# Patient Record
Sex: FEMALE | Race: OTHER
[De-identification: names, ages, dates, MRNs, and addresses within clinical notes are randomized per-mention and may not be internally consistent; named-entity substitution may affect disease eponyms.]

---

## 2017-03-22 ENCOUNTER — MESSAGE (OUTPATIENT)
Age: 62
End: 2017-03-22

## 2018-11-02 ENCOUNTER — LABORATORY RESULT (OUTPATIENT)
Age: 63
End: 2018-11-02

## 2018-11-02 ENCOUNTER — APPOINTMENT (OUTPATIENT)
Dept: UROGYNECOLOGY | Facility: CLINIC | Age: 63
End: 2018-11-02
Payer: MEDICARE

## 2018-11-02 VITALS
DIASTOLIC BLOOD PRESSURE: 80 MMHG | HEIGHT: 63 IN | BODY MASS INDEX: 34.55 KG/M2 | SYSTOLIC BLOOD PRESSURE: 130 MMHG | WEIGHT: 195 LBS

## 2018-11-02 DIAGNOSIS — R10.2 PELVIC AND PERINEAL PAIN: ICD-10-CM

## 2018-11-02 DIAGNOSIS — N95.2 POSTMENOPAUSAL ATROPHIC VAGINITIS: ICD-10-CM

## 2018-11-02 DIAGNOSIS — Z87.440 PERSONAL HISTORY OF URINARY (TRACT) INFECTIONS: ICD-10-CM

## 2018-11-02 DIAGNOSIS — R39.198 OTHER DIFFICULTIES WITH MICTURITION: ICD-10-CM

## 2018-11-02 PROCEDURE — 99204 OFFICE O/P NEW MOD 45 MIN: CPT | Mod: 25

## 2018-11-02 PROCEDURE — 51798 US URINE CAPACITY MEASURE: CPT

## 2020-12-16 PROBLEM — Z87.440 HISTORY OF URINARY TRACT INFECTION: Status: RESOLVED | Noted: 2018-11-01 | Resolved: 2020-12-16

## 2023-12-12 ENCOUNTER — EMERGENCY (EMERGENCY)
Facility: HOSPITAL | Age: 68
LOS: 0 days | Discharge: HOME | End: 2023-12-13
Attending: EMERGENCY MEDICINE
Payer: MEDICARE

## 2023-12-12 VITALS
WEIGHT: 179.9 LBS | HEIGHT: 63 IN | RESPIRATION RATE: 18 BRPM | TEMPERATURE: 98 F | OXYGEN SATURATION: 98 % | SYSTOLIC BLOOD PRESSURE: 192 MMHG | HEART RATE: 115 BPM | DIASTOLIC BLOOD PRESSURE: 80 MMHG

## 2023-12-12 DIAGNOSIS — Z91.048 OTHER NONMEDICINAL SUBSTANCE ALLERGY STATUS: ICD-10-CM

## 2023-12-12 DIAGNOSIS — M79.89 OTHER SPECIFIED SOFT TISSUE DISORDERS: ICD-10-CM

## 2023-12-12 DIAGNOSIS — Z87.891 PERSONAL HISTORY OF NICOTINE DEPENDENCE: ICD-10-CM

## 2023-12-12 PROCEDURE — 36415 COLL VENOUS BLD VENIPUNCTURE: CPT

## 2023-12-12 PROCEDURE — 80053 COMPREHEN METABOLIC PANEL: CPT

## 2023-12-12 PROCEDURE — 99285 EMERGENCY DEPT VISIT HI MDM: CPT | Mod: 25

## 2023-12-12 PROCEDURE — 85025 COMPLETE CBC W/AUTO DIFF WBC: CPT

## 2023-12-12 PROCEDURE — 93010 ELECTROCARDIOGRAM REPORT: CPT

## 2023-12-12 PROCEDURE — 93005 ELECTROCARDIOGRAM TRACING: CPT

## 2023-12-12 PROCEDURE — 99285 EMERGENCY DEPT VISIT HI MDM: CPT | Mod: FS

## 2023-12-12 PROCEDURE — 76882 US LMTD JT/FCL EVL NVASC XTR: CPT | Mod: LT

## 2023-12-12 PROCEDURE — 73080 X-RAY EXAM OF ELBOW: CPT | Mod: LT

## 2023-12-12 RX ORDER — SODIUM CHLORIDE 9 MG/ML
1000 INJECTION INTRAMUSCULAR; INTRAVENOUS; SUBCUTANEOUS ONCE
Refills: 0 | Status: COMPLETED | OUTPATIENT
Start: 2023-12-12 | End: 2023-12-12

## 2023-12-12 NOTE — ED ADULT TRIAGE NOTE - CHIEF COMPLAINT QUOTE
PT presents to the Ed c/o of abnormal EKG. Pt sent in from urgent care due to an abnormal EKG. Pt denies Chest pain or SOB. Pt only c/o of L arm pain and swelling which prompted her to go to urgent care

## 2023-12-13 VITALS
RESPIRATION RATE: 18 BRPM | HEART RATE: 83 BPM | DIASTOLIC BLOOD PRESSURE: 64 MMHG | SYSTOLIC BLOOD PRESSURE: 149 MMHG | TEMPERATURE: 98 F | OXYGEN SATURATION: 97 %

## 2023-12-13 LAB
ALBUMIN SERPL ELPH-MCNC: 3.9 G/DL — SIGNIFICANT CHANGE UP (ref 3.5–5.2)
ALBUMIN SERPL ELPH-MCNC: 3.9 G/DL — SIGNIFICANT CHANGE UP (ref 3.5–5.2)
ALP SERPL-CCNC: 114 U/L — SIGNIFICANT CHANGE UP (ref 30–115)
ALP SERPL-CCNC: 114 U/L — SIGNIFICANT CHANGE UP (ref 30–115)
ALT FLD-CCNC: 9 U/L — SIGNIFICANT CHANGE UP (ref 0–41)
ALT FLD-CCNC: 9 U/L — SIGNIFICANT CHANGE UP (ref 0–41)
ANION GAP SERPL CALC-SCNC: 12 MMOL/L — SIGNIFICANT CHANGE UP (ref 7–14)
ANION GAP SERPL CALC-SCNC: 12 MMOL/L — SIGNIFICANT CHANGE UP (ref 7–14)
AST SERPL-CCNC: 16 U/L — SIGNIFICANT CHANGE UP (ref 0–41)
AST SERPL-CCNC: 16 U/L — SIGNIFICANT CHANGE UP (ref 0–41)
BASOPHILS # BLD AUTO: 0.06 K/UL — SIGNIFICANT CHANGE UP (ref 0–0.2)
BASOPHILS # BLD AUTO: 0.06 K/UL — SIGNIFICANT CHANGE UP (ref 0–0.2)
BASOPHILS NFR BLD AUTO: 0.6 % — SIGNIFICANT CHANGE UP (ref 0–1)
BASOPHILS NFR BLD AUTO: 0.6 % — SIGNIFICANT CHANGE UP (ref 0–1)
BILIRUB SERPL-MCNC: <0.2 MG/DL — SIGNIFICANT CHANGE UP (ref 0.2–1.2)
BILIRUB SERPL-MCNC: <0.2 MG/DL — SIGNIFICANT CHANGE UP (ref 0.2–1.2)
BUN SERPL-MCNC: 13 MG/DL — SIGNIFICANT CHANGE UP (ref 10–20)
BUN SERPL-MCNC: 13 MG/DL — SIGNIFICANT CHANGE UP (ref 10–20)
CALCIUM SERPL-MCNC: 9.1 MG/DL — SIGNIFICANT CHANGE UP (ref 8.4–10.5)
CALCIUM SERPL-MCNC: 9.1 MG/DL — SIGNIFICANT CHANGE UP (ref 8.4–10.5)
CHLORIDE SERPL-SCNC: 100 MMOL/L — SIGNIFICANT CHANGE UP (ref 98–110)
CHLORIDE SERPL-SCNC: 100 MMOL/L — SIGNIFICANT CHANGE UP (ref 98–110)
CO2 SERPL-SCNC: 27 MMOL/L — SIGNIFICANT CHANGE UP (ref 17–32)
CO2 SERPL-SCNC: 27 MMOL/L — SIGNIFICANT CHANGE UP (ref 17–32)
CREAT SERPL-MCNC: 0.8 MG/DL — SIGNIFICANT CHANGE UP (ref 0.7–1.5)
CREAT SERPL-MCNC: 0.8 MG/DL — SIGNIFICANT CHANGE UP (ref 0.7–1.5)
EGFR: 79 ML/MIN/1.73M2 — SIGNIFICANT CHANGE UP
EGFR: 79 ML/MIN/1.73M2 — SIGNIFICANT CHANGE UP
EOSINOPHIL # BLD AUTO: 0.24 K/UL — SIGNIFICANT CHANGE UP (ref 0–0.7)
EOSINOPHIL # BLD AUTO: 0.24 K/UL — SIGNIFICANT CHANGE UP (ref 0–0.7)
EOSINOPHIL NFR BLD AUTO: 2.4 % — SIGNIFICANT CHANGE UP (ref 0–8)
EOSINOPHIL NFR BLD AUTO: 2.4 % — SIGNIFICANT CHANGE UP (ref 0–8)
GLUCOSE SERPL-MCNC: 114 MG/DL — HIGH (ref 70–99)
GLUCOSE SERPL-MCNC: 114 MG/DL — HIGH (ref 70–99)
HCT VFR BLD CALC: 37.1 % — SIGNIFICANT CHANGE UP (ref 37–47)
HCT VFR BLD CALC: 37.1 % — SIGNIFICANT CHANGE UP (ref 37–47)
HGB BLD-MCNC: 12.1 G/DL — SIGNIFICANT CHANGE UP (ref 12–16)
HGB BLD-MCNC: 12.1 G/DL — SIGNIFICANT CHANGE UP (ref 12–16)
IMM GRANULOCYTES NFR BLD AUTO: 0.3 % — SIGNIFICANT CHANGE UP (ref 0.1–0.3)
IMM GRANULOCYTES NFR BLD AUTO: 0.3 % — SIGNIFICANT CHANGE UP (ref 0.1–0.3)
LYMPHOCYTES # BLD AUTO: 2.55 K/UL — SIGNIFICANT CHANGE UP (ref 1.2–3.4)
LYMPHOCYTES # BLD AUTO: 2.55 K/UL — SIGNIFICANT CHANGE UP (ref 1.2–3.4)
LYMPHOCYTES # BLD AUTO: 25.5 % — SIGNIFICANT CHANGE UP (ref 20.5–51.1)
LYMPHOCYTES # BLD AUTO: 25.5 % — SIGNIFICANT CHANGE UP (ref 20.5–51.1)
MCHC RBC-ENTMCNC: 30.1 PG — SIGNIFICANT CHANGE UP (ref 27–31)
MCHC RBC-ENTMCNC: 30.1 PG — SIGNIFICANT CHANGE UP (ref 27–31)
MCHC RBC-ENTMCNC: 32.6 G/DL — SIGNIFICANT CHANGE UP (ref 32–37)
MCHC RBC-ENTMCNC: 32.6 G/DL — SIGNIFICANT CHANGE UP (ref 32–37)
MCV RBC AUTO: 92.3 FL — SIGNIFICANT CHANGE UP (ref 81–99)
MCV RBC AUTO: 92.3 FL — SIGNIFICANT CHANGE UP (ref 81–99)
MONOCYTES # BLD AUTO: 1.22 K/UL — HIGH (ref 0.1–0.6)
MONOCYTES # BLD AUTO: 1.22 K/UL — HIGH (ref 0.1–0.6)
MONOCYTES NFR BLD AUTO: 12.2 % — HIGH (ref 1.7–9.3)
MONOCYTES NFR BLD AUTO: 12.2 % — HIGH (ref 1.7–9.3)
NEUTROPHILS # BLD AUTO: 5.91 K/UL — SIGNIFICANT CHANGE UP (ref 1.4–6.5)
NEUTROPHILS # BLD AUTO: 5.91 K/UL — SIGNIFICANT CHANGE UP (ref 1.4–6.5)
NEUTROPHILS NFR BLD AUTO: 59 % — SIGNIFICANT CHANGE UP (ref 42.2–75.2)
NEUTROPHILS NFR BLD AUTO: 59 % — SIGNIFICANT CHANGE UP (ref 42.2–75.2)
NRBC # BLD: 0 /100 WBCS — SIGNIFICANT CHANGE UP (ref 0–0)
NRBC # BLD: 0 /100 WBCS — SIGNIFICANT CHANGE UP (ref 0–0)
PLATELET # BLD AUTO: 370 K/UL — SIGNIFICANT CHANGE UP (ref 130–400)
PLATELET # BLD AUTO: 370 K/UL — SIGNIFICANT CHANGE UP (ref 130–400)
PMV BLD: 10.3 FL — SIGNIFICANT CHANGE UP (ref 7.4–10.4)
PMV BLD: 10.3 FL — SIGNIFICANT CHANGE UP (ref 7.4–10.4)
POTASSIUM SERPL-MCNC: 4.3 MMOL/L — SIGNIFICANT CHANGE UP (ref 3.5–5)
POTASSIUM SERPL-MCNC: 4.3 MMOL/L — SIGNIFICANT CHANGE UP (ref 3.5–5)
POTASSIUM SERPL-SCNC: 4.3 MMOL/L — SIGNIFICANT CHANGE UP (ref 3.5–5)
POTASSIUM SERPL-SCNC: 4.3 MMOL/L — SIGNIFICANT CHANGE UP (ref 3.5–5)
PROT SERPL-MCNC: 7.6 G/DL — SIGNIFICANT CHANGE UP (ref 6–8)
PROT SERPL-MCNC: 7.6 G/DL — SIGNIFICANT CHANGE UP (ref 6–8)
RBC # BLD: 4.02 M/UL — LOW (ref 4.2–5.4)
RBC # BLD: 4.02 M/UL — LOW (ref 4.2–5.4)
RBC # FLD: 13.1 % — SIGNIFICANT CHANGE UP (ref 11.5–14.5)
RBC # FLD: 13.1 % — SIGNIFICANT CHANGE UP (ref 11.5–14.5)
SODIUM SERPL-SCNC: 139 MMOL/L — SIGNIFICANT CHANGE UP (ref 135–146)
SODIUM SERPL-SCNC: 139 MMOL/L — SIGNIFICANT CHANGE UP (ref 135–146)
WBC # BLD: 10.01 K/UL — SIGNIFICANT CHANGE UP (ref 4.8–10.8)
WBC # BLD: 10.01 K/UL — SIGNIFICANT CHANGE UP (ref 4.8–10.8)
WBC # FLD AUTO: 10.01 K/UL — SIGNIFICANT CHANGE UP (ref 4.8–10.8)
WBC # FLD AUTO: 10.01 K/UL — SIGNIFICANT CHANGE UP (ref 4.8–10.8)

## 2023-12-13 PROCEDURE — 73080 X-RAY EXAM OF ELBOW: CPT | Mod: 26,LT

## 2023-12-13 PROCEDURE — 76882 US LMTD JT/FCL EVL NVASC XTR: CPT | Mod: 26,LT

## 2023-12-13 RX ADMIN — SODIUM CHLORIDE 1000 MILLILITER(S): 9 INJECTION INTRAMUSCULAR; INTRAVENOUS; SUBCUTANEOUS at 00:19

## 2023-12-13 NOTE — ED PROVIDER NOTE - PHYSICAL EXAMINATION
Gen: NAD, AOx3  Head: NCAT  HEENT: PERRL, oral mucosa moist, normal conjunctiva, oropharynx clear without exudate or erythema  Lung: CTAB, no respiratory distress, no wheezing, rales, rhonchi  CV: normal s1/s2, rrr, Normal perfusion, pulses 2+ throughout  Abd: soft, NTND, no CVA tenderness  Genitourinary: no pelvic tenderness  MSK: no visible deformities, full range of motion in all 4 extremities, LUE: 2+ pulse, AROM, no erythema/streaking/rash, no bony tenderness, mild edema to medial elbow w/ no TTP   Neuro: CN II-XII grossly intact, No focal neurologic deficits  Skin: No rash   Psych: normal affect

## 2023-12-13 NOTE — ED PROVIDER NOTE - NS ED ATTENDING STATEMENT MOD
This was a shared visit with the [facility]. I reviewed and verified the documentation and independently performed the documented:

## 2023-12-13 NOTE — ED PROVIDER NOTE - PATIENT PORTAL LINK FT
You can access the FollowMyHealth Patient Portal offered by [facility] by registering at the following website: [email or web address]. By joining [employer]’s FollowMyHealth portal, you will also be able to view your health information using other applications (apps) compatible with our system. You can access the FollowMyHealth Patient Portal offered by [facility] by registering at the following website: [email or web address]. By joining [employer]’s FollowMyHealth portal, you will also be able to view your health information using other applications (apps) compatible with our system.

## 2023-12-13 NOTE — ED PROVIDER NOTE - NSFOLLOWUPCLINICS_GEN_ALL_ED_FT
[facility]  Orthpedic  [address], [place]   Phone: [phone]  Fax:   Follow Up Time: 7-10 Days     [facility]  Orthpedic  [address], [place]   Phone: [phone]  Fax:   Follow Up Time: 7-10 Days

## 2023-12-13 NOTE — ED ADULT NURSE NOTE - NSFALLUNIVINTERV_ED_ALL_ED
Bed/Stretcher in lowest position, wheels locked, appropriate side rails in place/Call bell, personal items and telephone in reach/Instruct patient to call for assistance before getting out of bed/chair/stretcher/Non-slip footwear applied when patient is off stretcher/[place] to call system/Physically safe environment - no spills, clutter or unnecessary equipment/Purposeful proactive rounding/Room/bathroom lighting operational, light cord in reach Bed/Stretcher in lowest position, wheels locked, appropriate side rails in place/Call bell, personal items and telephone in reach/Instruct patient to call for assistance before getting out of bed/chair/stretcher/Non-slip footwear applied when patient is off stretcher/[place] to call system/Physically safe environment - no spills, clutter or unnecessary equipment/Purposeful proactive rounding/Room/bathroom lighting operational, light cord in reach

## 2023-12-13 NOTE — ED PROVIDER NOTE - OBJECTIVE STATEMENT
69 yo female with a pmh of breast ca in remission presents c/o L elbow swelling that started today. pt denies any injuries/trauma. pt denies any pain to her arm. pt denies any other symptoms including chest pain, SOB, fevers, chill, headache, recent illness/travel, cough, abdominal pain.

## 2023-12-13 NOTE — ED PROVIDER NOTE - CLINICAL SUMMARY MEDICAL DECISION MAKING FREE TEXT BOX
Labs and EKG were ordered and reviewed, where indicated.  Imaging was ordered and reviewed by me, where indicated.  Appropriate medications for patient's presenting complaints were ordered and effects were reassessed, where indicated.  Patient's records (prior hospital, ED visit, and/or nursing home note) were reviewed, if available.  Additional history was obtained from EMS, family, and/or PCP (where available).  Escalation to admission/observation was considered.  However patient feels much better and patient/parent is comfortable with discharge.  Appropriate follow-up was arranged.     L elbow swelling, mild - labs wnl, xr no fx/dislocation/fb, soft tissue US no FF collection or other findings - all results d/w pt & copies given, strict return precautions discussed, rec outpt ortho f/u

## 2023-12-13 NOTE — ED PROVIDER NOTE - NSFOLLOWUPINSTRUCTIONS_ED_ALL_ED_FT
Please follow up with your primary care physician within 24-72 hours and return immediately if symptoms worsen.    Our Emergency Department Referral Coordinators will be reaching out to you in the next 24-48 hours from 9:00am to 5:00pm with a follow up appointment. Please expect a phone call from the hospital in that time frame. If you do not receive a call or if you have any questions or concerns, you can reach them at   [phone]    Peripheral Edema    Peripheral edema is swelling that is caused by a buildup of fluid. Peripheral edema most often affects the lower legs, ankles, and feet. It can also develop in the arms, hands, and face. The area of the body that has peripheral edema will look swollen. It may also feel heavy or warm. Your clothes may start to feel tight. Pressing on the area may make a temporary dent in your skin. You may not be able to move your arm or leg as much as usual.     There are many causes of peripheral edema. It can be a complication of other diseases, such as congestive heart failure, kidney disease, or a problem with your blood circulation. It also can be a side effect of certain medicines. It often happens to women during pregnancy. Sometimes, the cause is not known. Treating the underlying condition is often the only treatment for peripheral edema.    HOME CARE INSTRUCTIONS  Pay attention to any changes in your symptoms. Take these actions to help with your discomfort:    Raise (elevate) your legs while you are sitting or lying down.  Move around often to prevent stiffness and to lessen swelling. Do not sit or stand for long periods of time.  Wear support stockings as told by your health care provider.  Follow instructions from your health care provider about limiting salt (sodium) in your diet. Sometimes eating less salt can reduce swelling.  Take over-the-counter and prescription medicines only as told by your health care provider. Your health care provider may prescribe medicine to help your body get rid of excess water (diuretic).  Keep all follow-up visits as told by your health care provider. This is important.    SEEK MEDICAL CARE IF:  You have a fever.  Your edema starts suddenly or is getting worse, especially if you are pregnant or have a medical condition.  You have swelling in only one leg.  You have increased swelling and pain in your legs.    SEEK IMMEDIATE MEDICAL CARE IF:  You develop shortness of breath, especially when you are lying down.  You have pain in your chest or abdomen.  You feel weak.  You faint. Please follow up with your primary care physician within 24-72 hours and return immediately if symptoms worsen.    Our Emergency Department Referral Coordinators will be reaching out to you in the next 24-48 hours from 9:00am to 5:00pm with a follow up appointment. Please expect a phone call from the hospital in that time frame. If you do not receive a call or if you have any questions or concerns, you can reach them at   [phone]    Peripheral Edema    Peripheral edema is swelling that is caused by a buildup of fluid. Peripheral edema most often affects the lower legs, ankles, and feet. It can also develop in the arms, hands, and face. The area of the body that has peripheral edema will look swollen. It may also feel heavy or warm. Your clothes may start to feel tight. Pressing on the area may make a temporary dent in your skin. You may not be able to move your arm or leg as much as usual.     There are many causes of peripheral edema. It can be a complication of other diseases, such as congestive heart failure, kidney disease, or a problem with your blood circulation. It also can be a side effect of certain medicines. It often happens to women during pregnancy. Sometimes, the cause is not known. Treating the underlying condition is often the only treatment for peripheral edema.    HOME CARE INSTRUCTIONS  Pay attention to any changes in your symptoms. Take these actions to help with your discomfort:    Raise (elevate) your legs while you are sitting or lying down.  Move around often to prevent stiffness and to lessen swelling. Do not sit or stand for long periods of time.  Wear support stockings as told by your health care provider.  Follow instructions from your health care provider about limiting salt (sodium) in your diet. Sometimes eating less salt can reduce swelling.  Take over-the-counter and prescription medicines only as told by your health care provider. Your health care provider may prescribe medicine to help your body get rid of excess water (diuretic).  Keep all follow-up visits as told by your health care provider. This is important.    SEEK MEDICAL CARE IF:  You have a fever.  Your edema starts suddenly or is getting worse, especially if you are pregnant or have a medical condition.  You have swelling in only one leg.  You have increased swelling and pain in your legs.    SEEK IMMEDIATE MEDICAL CARE IF:  You develop shortness of breath, especially when you are lying down.  You have pain in your chest or abdomen.  You feel weak.  You faint.

## 2023-12-13 NOTE — ED PROVIDER NOTE - ATTENDING APP SHARED VISIT CONTRIBUTION OF CARE
68F pmh breast ca in remission p/w L elbow pain/swelling x 1d, went to outside  pta for same ekg was performed and pt was referred to ED for abnorm ekg. EKG nsr, no acute ischemia in ED. Pt denies any dizziness, diaphoresis, cp/sob, nv, abd pain, edema. rpts only Cc is swelling noted over medial aspect of L elbow. Denies any trauma. No focal numbness or weakness. No cough or hemoptysis. Denies any known h/o gout or other inflamm arthropathies.    PE:  elderly f nad  skin warm, dry  ncat  neck supple  rrr nl s1s2 no mrg  ctab no wrr  abd soft ntnd no palpable masses no rgr  back non-tender no cvat  ext- L elbow mild swelling medial aspect, no erythema or warmth, full rom/strength of elbow & throughout LUE, dpi; remainder of ext exam nml  neuro aaox3 grossly nf exam

## 2024-08-17 ENCOUNTER — INPATIENT (INPATIENT)
Facility: HOSPITAL | Age: 69
LOS: 1 days | Discharge: HOME | DRG: 392 | End: 2024-08-19
Attending: SURGERY | Admitting: SURGERY
Payer: MEDICARE

## 2024-08-17 VITALS
RESPIRATION RATE: 18 BRPM | SYSTOLIC BLOOD PRESSURE: 126 MMHG | WEIGHT: 164.91 LBS | HEART RATE: 58 BPM | TEMPERATURE: 100 F | OXYGEN SATURATION: 99 % | DIASTOLIC BLOOD PRESSURE: 75 MMHG

## 2024-08-17 LAB
ALBUMIN SERPL ELPH-MCNC: 4.3 G/DL — SIGNIFICANT CHANGE UP (ref 3.5–5.2)
ALP SERPL-CCNC: 99 U/L — SIGNIFICANT CHANGE UP (ref 30–115)
ALT FLD-CCNC: 9 U/L — SIGNIFICANT CHANGE UP (ref 0–41)
ANION GAP SERPL CALC-SCNC: 14 MMOL/L — SIGNIFICANT CHANGE UP (ref 7–14)
APPEARANCE UR: CLEAR — SIGNIFICANT CHANGE UP
AST SERPL-CCNC: 20 U/L — SIGNIFICANT CHANGE UP (ref 0–41)
BACTERIA # UR AUTO: NEGATIVE /HPF — SIGNIFICANT CHANGE UP
BASOPHILS # BLD AUTO: 0 K/UL — SIGNIFICANT CHANGE UP (ref 0–0.2)
BASOPHILS NFR BLD AUTO: 0 % — SIGNIFICANT CHANGE UP (ref 0–1)
BILIRUB DIRECT SERPL-MCNC: <0.2 MG/DL — SIGNIFICANT CHANGE UP (ref 0–0.3)
BILIRUB INDIRECT FLD-MCNC: SIGNIFICANT CHANGE UP MG/DL (ref 0.2–1.2)
BILIRUB SERPL-MCNC: 0.4 MG/DL — SIGNIFICANT CHANGE UP (ref 0.2–1.2)
BILIRUB UR-MCNC: NEGATIVE — SIGNIFICANT CHANGE UP
BUN SERPL-MCNC: 8 MG/DL — LOW (ref 10–20)
CALCIUM SERPL-MCNC: 9.6 MG/DL — SIGNIFICANT CHANGE UP (ref 8.4–10.5)
CAST: 0 /LPF — SIGNIFICANT CHANGE UP (ref 0–4)
CHLORIDE SERPL-SCNC: 101 MMOL/L — SIGNIFICANT CHANGE UP (ref 98–110)
CO2 SERPL-SCNC: 24 MMOL/L — SIGNIFICANT CHANGE UP (ref 17–32)
COLOR SPEC: YELLOW — SIGNIFICANT CHANGE UP
CREAT SERPL-MCNC: 0.8 MG/DL — SIGNIFICANT CHANGE UP (ref 0.7–1.5)
DIFF PNL FLD: NEGATIVE — SIGNIFICANT CHANGE UP
EGFR: 79 ML/MIN/1.73M2 — SIGNIFICANT CHANGE UP
EOSINOPHIL # BLD AUTO: 0 K/UL — SIGNIFICANT CHANGE UP (ref 0–0.7)
EOSINOPHIL NFR BLD AUTO: 0 % — SIGNIFICANT CHANGE UP (ref 0–8)
GLUCOSE SERPL-MCNC: 103 MG/DL — HIGH (ref 70–99)
GLUCOSE UR QL: NEGATIVE MG/DL — SIGNIFICANT CHANGE UP
HCT VFR BLD CALC: 35.5 % — LOW (ref 37–47)
HGB BLD-MCNC: 11.6 G/DL — LOW (ref 12–16)
KETONES UR-MCNC: NEGATIVE MG/DL — SIGNIFICANT CHANGE UP
LACTATE SERPL-SCNC: 1 MMOL/L — SIGNIFICANT CHANGE UP (ref 0.7–2)
LEUKOCYTE ESTERASE UR-ACNC: ABNORMAL
LIDOCAIN IGE QN: 28 U/L — SIGNIFICANT CHANGE UP (ref 7–60)
LYMPHOCYTES # BLD AUTO: 0.96 K/UL — LOW (ref 1.2–3.4)
LYMPHOCYTES # BLD AUTO: 7 % — LOW (ref 20.5–51.1)
MANUAL SMEAR VERIFICATION: SIGNIFICANT CHANGE UP
MCHC RBC-ENTMCNC: 30.1 PG — SIGNIFICANT CHANGE UP (ref 27–31)
MCHC RBC-ENTMCNC: 32.7 G/DL — SIGNIFICANT CHANGE UP (ref 32–37)
MCV RBC AUTO: 92.2 FL — SIGNIFICANT CHANGE UP (ref 81–99)
MONOCYTES # BLD AUTO: 1.56 K/UL — HIGH (ref 0.1–0.6)
MONOCYTES NFR BLD AUTO: 11.3 % — HIGH (ref 1.7–9.3)
NEUTROPHILS # BLD AUTO: 11.26 K/UL — HIGH (ref 1.4–6.5)
NEUTROPHILS NFR BLD AUTO: 81.7 % — HIGH (ref 42.2–75.2)
NITRITE UR-MCNC: NEGATIVE — SIGNIFICANT CHANGE UP
PH UR: 5.5 — SIGNIFICANT CHANGE UP (ref 5–8)
PLAT MORPH BLD: NORMAL — SIGNIFICANT CHANGE UP
PLATELET # BLD AUTO: 363 K/UL — SIGNIFICANT CHANGE UP (ref 130–400)
PMV BLD: 10.3 FL — SIGNIFICANT CHANGE UP (ref 7.4–10.4)
POLYCHROMASIA BLD QL SMEAR: SIGNIFICANT CHANGE UP
POTASSIUM SERPL-MCNC: 4.7 MMOL/L — SIGNIFICANT CHANGE UP (ref 3.5–5)
POTASSIUM SERPL-SCNC: 4.7 MMOL/L — SIGNIFICANT CHANGE UP (ref 3.5–5)
PROT SERPL-MCNC: 8.1 G/DL — HIGH (ref 6–8)
PROT UR-MCNC: SIGNIFICANT CHANGE UP MG/DL
RBC # BLD: 3.85 M/UL — LOW (ref 4.2–5.4)
RBC # FLD: 13.3 % — SIGNIFICANT CHANGE UP (ref 11.5–14.5)
RBC BLD AUTO: ABNORMAL
RBC CASTS # UR COMP ASSIST: 0 /HPF — SIGNIFICANT CHANGE UP (ref 0–4)
SODIUM SERPL-SCNC: 139 MMOL/L — SIGNIFICANT CHANGE UP (ref 135–146)
SP GR SPEC: 1.02 — SIGNIFICANT CHANGE UP (ref 1–1.03)
SQUAMOUS # UR AUTO: 2 /HPF — SIGNIFICANT CHANGE UP (ref 0–5)
UROBILINOGEN FLD QL: 0.2 MG/DL — SIGNIFICANT CHANGE UP (ref 0.2–1)
WBC # BLD: 13.78 K/UL — HIGH (ref 4.8–10.8)
WBC # FLD AUTO: 13.78 K/UL — HIGH (ref 4.8–10.8)
WBC UR QL: 5 /HPF — SIGNIFICANT CHANGE UP (ref 0–5)

## 2024-08-17 PROCEDURE — 84100 ASSAY OF PHOSPHORUS: CPT

## 2024-08-17 PROCEDURE — 99285 EMERGENCY DEPT VISIT HI MDM: CPT | Mod: FS

## 2024-08-17 PROCEDURE — 85025 COMPLETE CBC W/AUTO DIFF WBC: CPT

## 2024-08-17 PROCEDURE — 83735 ASSAY OF MAGNESIUM: CPT

## 2024-08-17 PROCEDURE — 36415 COLL VENOUS BLD VENIPUNCTURE: CPT

## 2024-08-17 PROCEDURE — 80048 BASIC METABOLIC PNL TOTAL CA: CPT

## 2024-08-17 PROCEDURE — 99222 1ST HOSP IP/OBS MODERATE 55: CPT

## 2024-08-17 PROCEDURE — 74177 CT ABD & PELVIS W/CONTRAST: CPT | Mod: 26,MC

## 2024-08-17 RX ORDER — PIPERACILLIN SODIUM AND TAZOBACTAM SODIUM 3; .375 G/15ML; G/15ML
3.38 INJECTION, POWDER, FOR SOLUTION INTRAVENOUS ONCE
Refills: 0 | Status: COMPLETED | OUTPATIENT
Start: 2024-08-17 | End: 2024-08-17

## 2024-08-17 RX ORDER — SODIUM CHLORIDE 9 MG/ML
1000 INJECTION INTRAMUSCULAR; INTRAVENOUS; SUBCUTANEOUS ONCE
Refills: 0 | Status: COMPLETED | OUTPATIENT
Start: 2024-08-17 | End: 2024-08-17

## 2024-08-17 RX ADMIN — PIPERACILLIN SODIUM AND TAZOBACTAM SODIUM 200 GRAM(S): 3; .375 INJECTION, POWDER, FOR SOLUTION INTRAVENOUS at 22:05

## 2024-08-17 RX ADMIN — Medication 4 MILLIGRAM(S): at 16:23

## 2024-08-17 RX ADMIN — SODIUM CHLORIDE 1000 MILLILITER(S): 9 INJECTION INTRAMUSCULAR; INTRAVENOUS; SUBCUTANEOUS at 16:23

## 2024-08-17 NOTE — ED PROVIDER NOTE - NS_BEDUNITTYPES_ED_ALL_ED
Dr. Ramos requesting to repeat renal panel next week D/T high potassium level from 9/11.    MED/SURG

## 2024-08-17 NOTE — ED PROVIDER NOTE - CLINICAL SUMMARY MEDICAL DECISION MAKING FREE TEXT BOX
70 yo F p/w LLQ abd pain. labs reviewed. Imaging with diverticulitis with abscess, given iv abx. admitted for further management,

## 2024-08-17 NOTE — ED PROVIDER NOTE - PHYSICAL EXAMINATION
CONSTITUTIONAL: Well-appearing; well-nourished; in no apparent distress.   EYES: PERRL; EOM intact.   ENT: normal nose; no rhinorrhea; normal pharynx with no tonsillar hypertrophy.   NECK: Supple; non-tender; no cervical lymphadenopathy.   CARDIOVASCULAR: Normal S1, S2; no murmurs, rubs, or gallops. Equal radial pulses  RESPIRATORY: Normal chest excursion with respiration; breath sounds clear and equal bilaterally; no wheezes, rhonchi, or rales.  GI/: Normal bowel sounds; non-distended; + LLQ ttp. no rebound or guarding  MS: No evidence of trauma or deformity. Normal ROM in all four extremities; non-tender to palpation; distal pulses are normal.   SKIN: Normal for age and race; warm; dry; good turgor; no apparent lesions or exudate.   NEURO/PSYCH: A & O x 4; grossly unremarkable. mood and manner are appropriate. Grooming and personal hygiene are appropriate. No apparent thoughts of harm to self or others.

## 2024-08-17 NOTE — ED PROVIDER NOTE - OBJECTIVE STATEMENT
69-year-old female with history of breast cancer Remission presenting to ER with 2 days of abdominal pain worse over left lower quadrant.  Pain is intermittent and sharp/crampy with associated fever Tmax 101.  Mild nausea no vomiting, diarrhea, bloody stools, urinary symptoms, flank pain, past abdominal surgeries.

## 2024-08-18 DIAGNOSIS — Z90.710 ACQUIRED ABSENCE OF BOTH CERVIX AND UTERUS: Chronic | ICD-10-CM

## 2024-08-18 DIAGNOSIS — K57.20 DIVERTICULITIS OF LARGE INTESTINE WITH PERFORATION AND ABSCESS WITHOUT BLEEDING: ICD-10-CM

## 2024-08-18 DIAGNOSIS — Z98.890 OTHER SPECIFIED POSTPROCEDURAL STATES: Chronic | ICD-10-CM

## 2024-08-18 LAB
ANION GAP SERPL CALC-SCNC: 15 MMOL/L — HIGH (ref 7–14)
BASOPHILS # BLD AUTO: 0.03 K/UL — SIGNIFICANT CHANGE UP (ref 0–0.2)
BASOPHILS NFR BLD AUTO: 0.3 % — SIGNIFICANT CHANGE UP (ref 0–1)
BUN SERPL-MCNC: 8 MG/DL — LOW (ref 10–20)
CALCIUM SERPL-MCNC: 9.3 MG/DL — SIGNIFICANT CHANGE UP (ref 8.4–10.5)
CHLORIDE SERPL-SCNC: 100 MMOL/L — SIGNIFICANT CHANGE UP (ref 98–110)
CO2 SERPL-SCNC: 25 MMOL/L — SIGNIFICANT CHANGE UP (ref 17–32)
CREAT SERPL-MCNC: 0.8 MG/DL — SIGNIFICANT CHANGE UP (ref 0.7–1.5)
EGFR: 79 ML/MIN/1.73M2 — SIGNIFICANT CHANGE UP
EOSINOPHIL # BLD AUTO: 0.12 K/UL — SIGNIFICANT CHANGE UP (ref 0–0.7)
EOSINOPHIL NFR BLD AUTO: 1.1 % — SIGNIFICANT CHANGE UP (ref 0–8)
GLUCOSE SERPL-MCNC: 73 MG/DL — SIGNIFICANT CHANGE UP (ref 70–99)
HCT VFR BLD CALC: 34.9 % — LOW (ref 37–47)
HGB BLD-MCNC: 11.2 G/DL — LOW (ref 12–16)
IMM GRANULOCYTES NFR BLD AUTO: 0.4 % — HIGH (ref 0.1–0.3)
LYMPHOCYTES # BLD AUTO: 1.94 K/UL — SIGNIFICANT CHANGE UP (ref 1.2–3.4)
LYMPHOCYTES # BLD AUTO: 17.2 % — LOW (ref 20.5–51.1)
MAGNESIUM SERPL-MCNC: 1.9 MG/DL — SIGNIFICANT CHANGE UP (ref 1.8–2.4)
MCHC RBC-ENTMCNC: 29.9 PG — SIGNIFICANT CHANGE UP (ref 27–31)
MCHC RBC-ENTMCNC: 32.1 G/DL — SIGNIFICANT CHANGE UP (ref 32–37)
MCV RBC AUTO: 93.1 FL — SIGNIFICANT CHANGE UP (ref 81–99)
MONOCYTES # BLD AUTO: 1.11 K/UL — HIGH (ref 0.1–0.6)
MONOCYTES NFR BLD AUTO: 9.8 % — HIGH (ref 1.7–9.3)
NEUTROPHILS # BLD AUTO: 8.05 K/UL — HIGH (ref 1.4–6.5)
NEUTROPHILS NFR BLD AUTO: 71.2 % — SIGNIFICANT CHANGE UP (ref 42.2–75.2)
NRBC # BLD: 0 /100 WBCS — SIGNIFICANT CHANGE UP (ref 0–0)
PHOSPHATE SERPL-MCNC: 3.6 MG/DL — SIGNIFICANT CHANGE UP (ref 2.1–4.9)
PLATELET # BLD AUTO: 368 K/UL — SIGNIFICANT CHANGE UP (ref 130–400)
PMV BLD: 10.8 FL — HIGH (ref 7.4–10.4)
POTASSIUM SERPL-MCNC: 4.8 MMOL/L — SIGNIFICANT CHANGE UP (ref 3.5–5)
POTASSIUM SERPL-SCNC: 4.8 MMOL/L — SIGNIFICANT CHANGE UP (ref 3.5–5)
RBC # BLD: 3.75 M/UL — LOW (ref 4.2–5.4)
RBC # FLD: 13.2 % — SIGNIFICANT CHANGE UP (ref 11.5–14.5)
SODIUM SERPL-SCNC: 140 MMOL/L — SIGNIFICANT CHANGE UP (ref 135–146)
WBC # BLD: 11.29 K/UL — HIGH (ref 4.8–10.8)
WBC # FLD AUTO: 11.29 K/UL — HIGH (ref 4.8–10.8)

## 2024-08-18 RX ORDER — ACETAMINOPHEN 325 MG/1
650 TABLET ORAL EVERY 6 HOURS
Refills: 0 | Status: DISCONTINUED | OUTPATIENT
Start: 2024-08-18 | End: 2024-08-19

## 2024-08-18 RX ORDER — ENOXAPARIN SODIUM 100 MG/ML
40 INJECTION SUBCUTANEOUS EVERY 24 HOURS
Refills: 0 | Status: DISCONTINUED | OUTPATIENT
Start: 2024-08-18 | End: 2024-08-19

## 2024-08-18 RX ORDER — KETOROLAC TROMETHAMINE 30 MG/ML
15 INJECTION, SOLUTION INTRAMUSCULAR EVERY 6 HOURS
Refills: 0 | Status: DISCONTINUED | OUTPATIENT
Start: 2024-08-18 | End: 2024-08-19

## 2024-08-18 RX ORDER — PIPERACILLIN SODIUM AND TAZOBACTAM SODIUM 3; .375 G/15ML; G/15ML
3.38 INJECTION, POWDER, FOR SOLUTION INTRAVENOUS EVERY 8 HOURS
Refills: 0 | Status: DISCONTINUED | OUTPATIENT
Start: 2024-08-18 | End: 2024-08-19

## 2024-08-18 RX ORDER — PIPERACILLIN SODIUM AND TAZOBACTAM SODIUM 3; .375 G/15ML; G/15ML
3.38 INJECTION, POWDER, FOR SOLUTION INTRAVENOUS ONCE
Refills: 0 | Status: COMPLETED | OUTPATIENT
Start: 2024-08-18 | End: 2024-08-18

## 2024-08-18 RX ORDER — PANTOPRAZOLE SODIUM 40 MG
40 TABLET, DELAYED RELEASE (ENTERIC COATED) ORAL EVERY 24 HOURS
Refills: 0 | Status: DISCONTINUED | OUTPATIENT
Start: 2024-08-18 | End: 2024-08-19

## 2024-08-18 RX ADMIN — PIPERACILLIN SODIUM AND TAZOBACTAM SODIUM 200 GRAM(S): 3; .375 INJECTION, POWDER, FOR SOLUTION INTRAVENOUS at 02:12

## 2024-08-18 RX ADMIN — ENOXAPARIN SODIUM 40 MILLIGRAM(S): 100 INJECTION SUBCUTANEOUS at 06:20

## 2024-08-18 RX ADMIN — Medication 110 MILLILITER(S): at 02:12

## 2024-08-18 RX ADMIN — PIPERACILLIN SODIUM AND TAZOBACTAM SODIUM 25 GRAM(S): 3; .375 INJECTION, POWDER, FOR SOLUTION INTRAVENOUS at 21:17

## 2024-08-18 RX ADMIN — PIPERACILLIN SODIUM AND TAZOBACTAM SODIUM 25 GRAM(S): 3; .375 INJECTION, POWDER, FOR SOLUTION INTRAVENOUS at 06:20

## 2024-08-18 RX ADMIN — PIPERACILLIN SODIUM AND TAZOBACTAM SODIUM 25 GRAM(S): 3; .375 INJECTION, POWDER, FOR SOLUTION INTRAVENOUS at 13:26

## 2024-08-18 RX ADMIN — Medication 40 MILLIGRAM(S): at 06:20

## 2024-08-18 NOTE — H&P ADULT - NSHPPHYSICALEXAM_GEN_ALL_CORE
Vitals:   T(F): 98.3 (08-17-24 @ 23:50), Max: 99.7 (08-17-24 @ 14:50)  HR: 95 (08-17-24 @ 23:50)  BP: 116/70 (08-17-24 @ 23:50)  RR: 17 (08-17-24 @ 23:50)  SpO2: 96% (08-17-24 @ 23:50)        PHYSICAL EXAM:  General: NAD, AAOx3, calm and cooperative  Cardiac: RRR   Respiratory: equal chest raise BL,  normal respiratory effort  Abdomen: Soft, non-distended, no rebound, no guarding, non-peritonitic; +tenderness LLQ>RLQ  MSK: moving all extremities freely  Skin: Warm/dry, normal color

## 2024-08-18 NOTE — H&P ADULT - NSHPLABSRESULTS_GEN_ALL_CORE
LAB/STUDIES:  Labs:  CAPILLARY BLOOD GLUCOSE                              11.6   13.78 )-----------( 363      ( 17 Aug 2024 16:24 )             35.5       Auto Neutrophil %: 81.7 % (24 @ 16:24)        139  |  101  |  8<L>  ----------------------------<  103<H>  4.7   |  24  |  0.8      Calcium: 9.6 mg/dL (24 @ 16:24)      LFTs:             8.1  | 0.4  | 20       ------------------[99      ( 17 Aug 2024 16:24 )  4.3  | <0.2 | 9           Lipase:28     Amylase:x         Lactate, Blood: 1.0 mmol/L (24 @ 16:24)      Coags:            Urinalysis Basic - ( 17 Aug 2024 19:51 )    Color: Yellow / Appearance: Clear / S.020 / pH: x  Gluc: x / Ketone: Negative mg/dL  / Bili: Negative / Urobili: 0.2 mg/dL   Blood: x / Protein: Trace mg/dL / Nitrite: Negative   Leuk Esterase: Small / RBC: 0 /HPF / WBC 5 /HPF   Sq Epi: x / Non Sq Epi: 2 /HPF / Bacteria: Negative /HPF        Urinalysis with Rflx Culture (collected 17 Aug 2024 19:51)              IMAGING:  < from: CT Abdomen and Pelvis w/ IV Cont (24 @ 20:24) >    PERITONEUM/MESENTERY/BOWEL: Diffuse sigmoid colonic wall thickening/edema   and pericolonic fat stranding consistent with colitis/diverticulitis   (series 3, image 119). 2.6 x 1.2 cm rim-enhancing fluid collection within   the wall of the sigmoid colon consistent with an intramural abscess   (series 3, image 115). No bowel obstruction or perforation. No ascites.   Unremarkable appendix.    BONES/SOFT TISSUES: Small fat-containing umbilical hernia. Mild   degenerative changes of the hips and spine.    OTHER: Aortic atherosclerotic calcifications.      IMPRESSION:    Sigmoid colitis versus diverticulitis with a 2.6 x 1.2 cm intramural   abscess (series 3, image 115).  I have reviewed the above preliminary report the following   comment----these findings are more likely to represent acute   diverticulitis.    < end of copied text >

## 2024-08-18 NOTE — H&P ADULT - HISTORY OF PRESENT ILLNESS
Pt is a 69y female PMH breast cancer (in remission), PSH hysterectomy presents with 2 days of lower abdominal discomfort that has worsened this evening. Pt reports fever at home with a temp 101. Pt reports recent travel just returned to [place] today. Endorses constipation which she took 2 cans of prune juice.  Last colonoscopy was 2 years ago, pt reports diverticulosis. Denies nausea, vomiting, diarrhea, chills. CT A/P significant for sigmoid diverticulitis with wall thickening/edema, pericolonic fat stranding,  2.6 x1.2 cm intramural abscess without perforation.    Pt is a 69y female PMH breast cancer (in remission), PSH hysterectomy for fibroids presents with 2 days of lower abdominal discomfort that has worsened this evening. Pt reports fever at home with a temp 101. Pt reports recent travel just returned to [place] today. Endorses constipation which she took 2 cans of prune juice.  Last colonoscopy was 2 years ago, pt reports diverticulosis. Denies nausea, vomiting, diarrhea, chills. CT A/P significant for sigmoid diverticulitis with wall thickening/edema, pericolonic fat stranding,  2.6 x1.2 cm intramural abscess without perforation.

## 2024-08-18 NOTE — H&P ADULT - ATTENDING COMMENTS
Patient seen and examined with surgery resident in ED in chair and discussed management plans with patient and significant other female by side.  patient comfortable with mild LLQ tenderness with well healed laparoscopic scars from hysterectomy done at  [place] in the [facility]. No loss of appetite. Fever improved now. Lab and images reviewed consistent with clinical and radiological findings of acute diverticulitis. Antibiotics and follow up inpatient.

## 2024-08-18 NOTE — H&P ADULT - ASSESSMENT
ASSESSMENT:  Pt is a 69y female PMH breast cancer (in remission), PSH hysterectomy presents with 2 days of lower abdominal discomfort that has worsened this evening. Pt reports fever at home with a temp 101. Pt reports recent travel just returned to [place] today. Endorses constipation which she took 2 cans of prune juice.  Last colonoscopy was 2 years ago, pt reports diverticulosis. Denies nausea, vomiting, diarrhea, chills. CT A/P significant for sigmoid diverticulitis with wall thickening/edema, pericolonic fat stranding,  2.6 x1.2 cm intramural abscess without perforation.       PLAN:    - IV abx (zosyn)  - NPO, IVF  - trend WBC  - monitor for fevers  - monitor bowel function  - strict I&Os  - hemodynamic monitoring    -[phone]

## 2024-08-18 NOTE — PATIENT PROFILE ADULT - FALL HARM RISK - RISK INTERVENTIONS

## 2024-08-19 ENCOUNTER — TRANSCRIPTION ENCOUNTER (OUTPATIENT)
Age: 69
End: 2024-08-19

## 2024-08-19 VITALS
TEMPERATURE: 98 F | RESPIRATION RATE: 18 BRPM | HEART RATE: 98 BPM | DIASTOLIC BLOOD PRESSURE: 71 MMHG | SYSTOLIC BLOOD PRESSURE: 144 MMHG | OXYGEN SATURATION: 95 %

## 2024-08-19 LAB
CULTURE RESULTS: SIGNIFICANT CHANGE UP
SPECIMEN SOURCE: SIGNIFICANT CHANGE UP

## 2024-08-19 PROCEDURE — 99238 HOSP IP/OBS DSCHRG MGMT 30/<: CPT

## 2024-08-19 RX ORDER — METRONIDAZOLE 250 MG
1 TABLET ORAL
Qty: 42 | Refills: 0
Start: 2024-08-19 | End: 2024-09-01

## 2024-08-19 RX ORDER — LEVOFLOXACIN 5 MG/ML
1 INJECTION, SOLUTION INTRAVENOUS
Qty: 14 | Refills: 0
Start: 2024-08-19 | End: 2024-09-01

## 2024-08-19 RX ADMIN — ENOXAPARIN SODIUM 40 MILLIGRAM(S): 100 INJECTION SUBCUTANEOUS at 05:44

## 2024-08-19 RX ADMIN — Medication 110 MILLILITER(S): at 05:44

## 2024-08-19 RX ADMIN — Medication 40 MILLIGRAM(S): at 05:44

## 2024-08-19 RX ADMIN — PIPERACILLIN SODIUM AND TAZOBACTAM SODIUM 25 GRAM(S): 3; .375 INJECTION, POWDER, FOR SOLUTION INTRAVENOUS at 13:27

## 2024-08-19 RX ADMIN — PIPERACILLIN SODIUM AND TAZOBACTAM SODIUM 25 GRAM(S): 3; .375 INJECTION, POWDER, FOR SOLUTION INTRAVENOUS at 05:44

## 2024-08-19 NOTE — PROGRESS NOTE ADULT - ATTENDING COMMENTS
Trauma/Acute Care Surgery Attending Note Attestation  Patient was seen and examined bedside.  I reviewed the resident/PA note and agreed with above assessment and plan with following additions and corrections.    Pain improved. Passing flatus and had BM. No nausea, vomiting.    T(C): 36.6 (08-19-24 @ 08:01), Max: 36.9 (08-18-24 @ 16:06)  HR: 99 (08-19-24 @ 08:01) (99 - 101)  BP: 123/74 (08-19-24 @ 08:01) (123/74 - 127/79)  RR: 18 (08-19-24 @ 08:01) (18 - 18)  SpO2: 96% (08-19-24 @ 08:01) (96% - 100%)    I independently performed a medically appropriate exam. The exam was notable for mild suprapubic tenderness.                          11.2   11.29 )-----------( 368      ( 18 Aug 2024 20:03 )             34.9     08-18    140  |  100  |  8<L>  ----------------------------<  73  4.8   |  25  |  0.8    Ca 9.3; Mg 1.9; Phos 3.6      ( 17 Aug 2024 16:24 )  Alb: 4.3 g/dL / Pro: 8.1 g/dL / AlkPhos: 99 U/L / ALT: 9 U/L / AST: 20 U/L / GGT:x     / Tbili 0.4 mg/dL/ Dbili <0.2 mg/dL / Indbili tnp mg/dL      Lactate, Blood: 1.0 mmol/L (08-17 @ 16:24)        Assessment/Plan:  69y Female PMH breast cancer admitted with sigmoid colon diverticulitis with pericolonic abscess ([place] 1).  - Sigmoid colon diverticulitis with pericolonic abscess - continue zosyn, start clear liquid diet and advance as tolerated, multimodal pain control  - DVT Ppx    Discussed outpatient follow up with GI for colonoscopy in 6 weeks. Patient had a colonoscopy 2-3 years ago. This is her first episode of diverticulitis, so I recommended she go for repeat colonoscopy. She has already reached out to her GI doctor to get an appointment after she is discharged. Possible discharge home later this afternoon/early evening if tolerating diet. Likely discharge on oral Levaquin/Flagyl versus Augmentin for 7-10 days.      Jj Stein MD  Trauma/Acute Care Surgery/Surgical Critical Care Attending

## 2024-08-19 NOTE — DISCHARGE NOTE PROVIDER - HOSPITAL COURSE
Pt is a 69y female PMH breast cancer (in remission), PSH hysterectomy for fibroids presents with 2 days of lower abdominal discomfort that has worsened this evening. Pt reports fever at home with a temp 101. Pt reports recent travel just returned to [place] on the day of presentation. Endorses constipation which she took 2 cans of prune juice.  Last colonoscopy was 2 years ago, pt reports diverticulosis. Denies nausea, vomiting, diarrhea, chills. CT A/P significant for sigmoid diverticulitis with wall thickening/edema, pericolonic fat stranding,  2.6 x1.2 cm intramural abscess without perforation. Patient was admitted to surgical service on IV zosyn. She was made NPO and started on IV fluids.          - trend WBC  - monitor for fevers  - monitor bowel function  - strict I&Os  - hemodynamic monitoring Pt is a 69y female PMH breast cancer (in remission), PSH hysterectomy for fibroids presented on 8/18 with 2 days of lower abdominal discomfort that has worsened this evening. Pt reports fever at home with a temp 101. Pt reports recent travel just returned to [place] on the day of presentation. Endorses constipation which she took 2 cans of prune juice.  Last colonoscopy was 2 years ago, pt reports diverticulosis. Denies nausea, vomiting, diarrhea, chills. CT A/P significant for sigmoid diverticulitis with wall thickening/edema, pericolonic fat stranding,  2.6 x1.2 cm intramural abscess without perforation. Patient was admitted to surgical service on IV zosyn. She was made NPO and started on IV fluids and put on Tylenol and Toradol for pain control. On 8/19 she was advanced to CLD and tolerated well. She was then put on a regular diet and was deemed medically fit for discharge after tolerating. Pt is a 69y female PMH breast cancer (in remission), PSH hysterectomy for fibroids presented on 8/18 with 2 days of lower abdominal discomfort that has worsened this evening. Pt reports fever at home with a temp 101. Pt reports recent travel just returned to [place] on the day of presentation. Endorses constipation which she took 2 cans of prune juice.  Last colonoscopy was 2 years ago, pt reports diverticulosis. Denies nausea, vomiting, diarrhea, chills. CT A/P significant for sigmoid diverticulitis with wall thickening/edema, pericolonic fat stranding,  2.6 x1.2 cm intramural abscess without perforation. Patient was admitted to surgical service on IV zosyn. She was made NPO and started on IV fluids and put on Tylenol and Toradol for pain control. On 8/19 she was advanced to CLD and tolerated well. She was then put on a regular diet and was deemed medically fit for discharge after tolerating. Patient to follow up with her gastroenterologist for outpatient colonoscopy.

## 2024-08-19 NOTE — DISCHARGE NOTE PROVIDER - NSDCFUADDINST_GEN_ALL_CORE_FT
Low fat diet.  Take Levaquin 750 once a day and Flagyl 500 every 8 hours for 14 days.  Follow up with your Gastroenterologist within 2 weeks, schedule a colonoscopy in 6 weeks.

## 2024-08-19 NOTE — INPATIENT CERTIFICATION FOR MEDICARE PATIENTS - PHYSICIAN CONCUR
I concur with the Admission Order and I certify that services are provided in accordance with Section 42 CFR § 412.3 Additional Complaints

## 2024-08-19 NOTE — DISCHARGE NOTE PROVIDER - NSDCCPCAREPLAN_GEN_ALL_CORE_FT
PRINCIPAL DISCHARGE DIAGNOSIS  Diagnosis: Diverticulitis of large intestine with abscess  Assessment and Plan of Treatment: SURGERY DISCHARGE INSTRUCTIONS  FOLLOW-UP - with your Primary Care Provider in 2 weeks. Call the office to make an appointment or if you have any questions/concerns.  DIET - regular.   PAIN MEDS - Take prescription pain meds as instructed but only if needed as they can be addicting. Take over the counter extra strength tylenol 650mg and/or ibuprofen 400mg with food every 6 hours for pain instead of prescription pain meds if you do not need stronger pain control. Do not take tylenol in addition to your prescription pain med if your prescription pain med already has tylenol in it. No more than 4g of tylenol in 24hrs or 1g in 4 hrs. No mixing alcohol with prescription pain meds. No driving or operating machinery while taking prescription pain meds. Drink plenty of water and increase your fiber intake (unless your diet restricts fiber) while taking prescription pain meds as these can cause constipation and abdominal straining. If you do not have a bowel movement in 3 days take an over the counter stool softener of your choice daily. If you still do not have a bowel movement the following 2 days call your primary care physician.  ANTIBIOTICS- Take antibiotics as directed if prescribed.   OTHER MEDS - If you have any questions about your other regular home medications please call your primary care physician or the physician who prescribed those medications to you.   If you develop fever, dizziness, chest pain, trouble breathing, nausea, vomiting, increasing abdominal pain, inability to pass bowel movements, redness/pain/discharge from incisions. Please call the office or go to the emergency room immediately.

## 2024-08-19 NOTE — PROGRESS NOTE ADULT - SUBJECTIVE AND OBJECTIVE BOX
GENERAL SURGERY PROGRESS NOTE    Patient: JOSE DE JESUS MYLES , 69y ([birth year])Female   MRN: [id number]  Location: [address]  Visit: 08-17-24 Inpatient  Date: 08-19-24 @ 01:24    Hospital Day #: 2    Procedure/Dx/Injuries: acute diverticulitis  Events of past 24 hours: No acute events overnight. Patient was in mild LLQ pain.     PAST MEDICAL & SURGICAL HISTORY:  Breast cancer  History of hysteroscop  H/O: hysterectomy          Vitals:   T(F): 98.2 (08-18-24 @ 23:24), Max: 98.4 (08-18-24 @ 16:06)  HR: 101 (08-18-24 @ 16:06)  BP: 127/79 (08-18-24 @ 23:24)  RR: 18 (08-18-24 @ 23:24)  SpO2: 100% (08-18-24 @ 23:24)      Diet, NPO:   Except Medications     Special Instructions for Nursing:  Except Medications      Fluids:     I & O's:    Bowel Movement: : [x] YES  Flatus: : [x] YES    PHYSICAL EXAM:  General: NAD, AAOx3, calm and cooperative  Cardiac: RRR   Respiratory: equal chest raise BL,  normal respiratory effort  Abdomen: Soft, non-distended, no rebound, no guarding, non-peritonitic; +tenderness LLQ>RLQ  MSK: moving all extremities freely  Skin: Warm/dry, normal color    MEDICATIONS  (STANDING):  enoxaparin Injectable 40 milliGRAM(s) SubCutaneous every 24 hours  lactated ringers. 1000 milliLiter(s) (110 mL/Hr) IV Continuous <Continuous>  pantoprazole  Injectable 40 milliGRAM(s) IV Push every 24 hours  piperacillin/tazobactam IVPB.. 3.375 Gram(s) IV Intermittent every 8 hours    MEDICATIONS  (PRN):  acetaminophen     Tablet .. 650 milliGRAM(s) Oral every 6 hours PRN Temp greater or equal to 38C (100.4F), Mild Pain (1 - 3)  ketorolac   Injectable 15 milliGRAM(s) IV Push every 6 hours PRN Moderate Pain (4 - 6)      DVT PROPHYLAXIS: enoxaparin Injectable 40 milliGRAM(s) SubCutaneous every 24 hours    GI PROPHYLAXIS: pantoprazole  Injectable 40 milliGRAM(s) IV Push every 24 hours    ANTICOAGULATION:   ANTIBIOTICS:  piperacillin/tazobactam IVPB.. 3.375 Gram(s)            LAB/STUDIES:  Labs:  CAPILLARY BLOOD GLUCOSE                              11.2   11.29 )-----------( 368      ( 18 Aug 2024 20:03 )             34.9       Auto Neutrophil %: 71.2 % (08-18-24 @ 20:03)  Auto Immature Granulocyte %: 0.4 % (08-18-24 @ 20:03)    08-18    140  |  100  |  8<L>  ----------------------------<  73  4.8   |  25  |  0.8      Calcium: 9.3 mg/dL (08-18-24 @ 20:03)      LFTs:             8.1  | 0.4  | 20       ------------------[99      ( 17 Aug 2024 16:24 )  4.3  | <0.2 | 9           Lipase:28     Amylase:x         Lactate, Blood: 1.0 mmol/L (08-17-24 @ 16:24)      Coags:            Urinalysis Basic - ( 18 Aug 2024 20:03 )    Color: x / Appearance: x / SG: x / pH: x  Gluc: 73 mg/dL / Ketone: x  / Bili: x / Urobili: x   Blood: x / Protein: x / Nitrite: x   Leuk Esterase: x / RBC: x / WBC x   Sq Epi: x / Non Sq Epi: x / Bacteria: x        Urinalysis with Rflx Culture (collected 17 Aug 2024 19:51)    IMAGING:    < from: CT Abdomen and Pelvis w/ IV Cont (08.17.24 @ 20:24) >  Sigmoid colitis versus diverticulitis with a 2.6 x 1.2 cm intramural   abscess (series 3, image 115).  I have reviewed the above preliminary report the following   comment----these findings are more likely to represent acute   diverticulitis.      ASSESSMENT:  Pt is a 69y female PMH breast cancer (in remission), PSH hysterectomy for fibroids presents with 2 days of lower abdominal discomfort that has worsened this evening. Pt reports fever at home with a temp 101. Pt reports recent travel just returned to [place] today. Endorses constipation which she took 2 cans of prune juice.  Last colonoscopy was 2 years ago, pt reports diverticulosis. Denies nausea, vomiting, diarrhea, chills. CT A/P significant for sigmoid diverticulitis with wall thickening/edema, pericolonic fat stranding,  2.6 x1.2 cm intramural abscess without perforation.       PLAN:  - Keep NPO  - Maintain IVF  - Keep ABx    TRAUMA SURGERY SPECTRA: [id number]

## 2024-08-19 NOTE — DISCHARGE NOTE PROVIDER - NSDCMRMEDTOKEN_GEN_ALL_CORE_FT
acetaminophen-oxyCODONE 325 mg-5 mg oral tablet: 1 tab(s) orally every 6 hours, As Needed -for severe pain MDD:6 Tabs   acetaminophen-oxyCODONE 325 mg-5 mg oral tablet: 1 tab(s) orally every 6 hours, As Needed -for severe pain MDD:6 Tabs  levoFLOXacin 750 mg oral tablet: 1 tab(s) orally once a day  metroNIDAZOLE 500 mg oral tablet: 1 tab(s) orally every 8 hours

## 2024-08-19 NOTE — DISCHARGE NOTE NURSING/CASE MANAGEMENT/SOCIAL WORK - PATIENT PORTAL LINK FT
You can access the FollowMyHealth Patient Portal offered by [facility] by registering at the following website: [email or web address]. By joining [email or web address]’s FollowMyHealth portal, you will also be able to view your health information using other applications (apps) compatible with our system.

## 2024-08-23 LAB
CULTURE RESULTS: SIGNIFICANT CHANGE UP
CULTURE RESULTS: SIGNIFICANT CHANGE UP
SPECIMEN SOURCE: SIGNIFICANT CHANGE UP
SPECIMEN SOURCE: SIGNIFICANT CHANGE UP

## 2024-08-27 DIAGNOSIS — R10.9 UNSPECIFIED ABDOMINAL PAIN: ICD-10-CM

## 2024-08-27 DIAGNOSIS — Z91.048 OTHER NONMEDICINAL SUBSTANCE ALLERGY STATUS: ICD-10-CM

## 2024-08-27 DIAGNOSIS — K57.20 DIVERTICULITIS OF LARGE INTESTINE WITH PERFORATION AND ABSCESS WITHOUT BLEEDING: ICD-10-CM

## 2024-08-27 DIAGNOSIS — Z85.3 PERSONAL HISTORY OF MALIGNANT NEOPLASM OF BREAST: ICD-10-CM

## 2024-08-27 DIAGNOSIS — Z79.891 LONG TERM (CURRENT) USE OF OPIATE ANALGESIC: ICD-10-CM

## 2025-02-23 ENCOUNTER — INPATIENT (INPATIENT)
Facility: HOSPITAL | Age: 70
LOS: 8 days | Discharge: HOME HEALTH | DRG: 392 | End: 2025-03-04
Attending: SURGERY | Admitting: SURGERY
Payer: MEDICARE

## 2025-02-23 VITALS
OXYGEN SATURATION: 97 % | SYSTOLIC BLOOD PRESSURE: 122 MMHG | WEIGHT: 166.01 LBS | TEMPERATURE: 98 F | HEIGHT: 63 IN | RESPIRATION RATE: 19 BRPM | HEART RATE: 102 BPM | DIASTOLIC BLOOD PRESSURE: 77 MMHG

## 2025-02-23 DIAGNOSIS — K57.80 DIVERTICULITIS OF INTESTINE, PART UNSPECIFIED, WITH PERFORATION AND ABSCESS WITHOUT BLEEDING: ICD-10-CM

## 2025-02-23 DIAGNOSIS — Z98.890 OTHER SPECIFIED POSTPROCEDURAL STATES: Chronic | ICD-10-CM

## 2025-02-23 DIAGNOSIS — Z90.710 ACQUIRED ABSENCE OF BOTH CERVIX AND UTERUS: Chronic | ICD-10-CM

## 2025-02-23 PROBLEM — C50.919 MALIGNANT NEOPLASM OF UNSPECIFIED SITE OF UNSPECIFIED FEMALE BREAST: Chronic | Status: ACTIVE | Noted: 2024-08-18

## 2025-02-23 LAB
ALBUMIN SERPL ELPH-MCNC: 4.2 G/DL — SIGNIFICANT CHANGE UP (ref 3.5–5.2)
ALP SERPL-CCNC: 109 U/L — SIGNIFICANT CHANGE UP (ref 30–115)
ALT FLD-CCNC: 7 U/L — SIGNIFICANT CHANGE UP (ref 0–41)
ANION GAP SERPL CALC-SCNC: 11 MMOL/L — SIGNIFICANT CHANGE UP (ref 7–14)
APPEARANCE UR: CLEAR — SIGNIFICANT CHANGE UP
APTT BLD: 34.4 SEC — SIGNIFICANT CHANGE UP (ref 27–39.2)
AST SERPL-CCNC: 14 U/L — SIGNIFICANT CHANGE UP (ref 0–41)
BASOPHILS # BLD AUTO: 0.02 K/UL — SIGNIFICANT CHANGE UP (ref 0–0.2)
BASOPHILS NFR BLD AUTO: 0.1 % — SIGNIFICANT CHANGE UP (ref 0–1)
BILIRUB DIRECT SERPL-MCNC: <0.2 MG/DL — SIGNIFICANT CHANGE UP (ref 0–0.3)
BILIRUB INDIRECT FLD-MCNC: >0.1 MG/DL — LOW (ref 0.2–1.2)
BILIRUB SERPL-MCNC: 0.3 MG/DL — SIGNIFICANT CHANGE UP (ref 0.2–1.2)
BILIRUB UR-MCNC: NEGATIVE — SIGNIFICANT CHANGE UP
BUN SERPL-MCNC: 9 MG/DL — LOW (ref 10–20)
CALCIUM SERPL-MCNC: 9.6 MG/DL — SIGNIFICANT CHANGE UP (ref 8.4–10.5)
CHLORIDE SERPL-SCNC: 102 MMOL/L — SIGNIFICANT CHANGE UP (ref 98–110)
CO2 SERPL-SCNC: 26 MMOL/L — SIGNIFICANT CHANGE UP (ref 17–32)
COLOR SPEC: YELLOW — SIGNIFICANT CHANGE UP
CREAT SERPL-MCNC: 0.8 MG/DL — SIGNIFICANT CHANGE UP (ref 0.7–1.5)
DIFF PNL FLD: NEGATIVE — SIGNIFICANT CHANGE UP
EGFR: 78 ML/MIN/1.73M2 — SIGNIFICANT CHANGE UP
EGFR: 78 ML/MIN/1.73M2 — SIGNIFICANT CHANGE UP
EOSINOPHIL # BLD AUTO: 0.01 K/UL — SIGNIFICANT CHANGE UP (ref 0–0.7)
EOSINOPHIL NFR BLD AUTO: 0.1 % — SIGNIFICANT CHANGE UP (ref 0–8)
GLUCOSE SERPL-MCNC: 118 MG/DL — HIGH (ref 70–99)
GLUCOSE UR QL: NEGATIVE MG/DL — SIGNIFICANT CHANGE UP
HCT VFR BLD CALC: 34.6 % — LOW (ref 37–47)
HGB BLD-MCNC: 11.3 G/DL — LOW (ref 12–16)
IMM GRANULOCYTES NFR BLD AUTO: 0.4 % — HIGH (ref 0.1–0.3)
INR BLD: 1.17 RATIO — SIGNIFICANT CHANGE UP (ref 0.65–1.3)
KETONES UR-MCNC: NEGATIVE MG/DL — SIGNIFICANT CHANGE UP
LACTATE SERPL-SCNC: 1.5 MMOL/L — SIGNIFICANT CHANGE UP (ref 0.7–2)
LEUKOCYTE ESTERASE UR-ACNC: NEGATIVE — SIGNIFICANT CHANGE UP
LIDOCAIN IGE QN: 46 U/L — SIGNIFICANT CHANGE UP (ref 7–60)
LYMPHOCYTES # BLD AUTO: 1.21 K/UL — SIGNIFICANT CHANGE UP (ref 1.2–3.4)
LYMPHOCYTES # BLD AUTO: 8.8 % — LOW (ref 20.5–51.1)
MCHC RBC-ENTMCNC: 30.1 PG — SIGNIFICANT CHANGE UP (ref 27–31)
MCHC RBC-ENTMCNC: 32.7 G/DL — SIGNIFICANT CHANGE UP (ref 32–37)
MCV RBC AUTO: 92 FL — SIGNIFICANT CHANGE UP (ref 81–99)
MONOCYTES # BLD AUTO: 1.15 K/UL — HIGH (ref 0.1–0.6)
MONOCYTES NFR BLD AUTO: 8.4 % — SIGNIFICANT CHANGE UP (ref 1.7–9.3)
NEUTROPHILS # BLD AUTO: 11.28 K/UL — HIGH (ref 1.4–6.5)
NEUTROPHILS NFR BLD AUTO: 82.2 % — HIGH (ref 42.2–75.2)
NITRITE UR-MCNC: NEGATIVE — SIGNIFICANT CHANGE UP
NRBC BLD AUTO-RTO: 0 /100 WBCS — SIGNIFICANT CHANGE UP (ref 0–0)
PH UR: 7 — SIGNIFICANT CHANGE UP (ref 5–8)
PLATELET # BLD AUTO: 372 K/UL — SIGNIFICANT CHANGE UP (ref 130–400)
PMV BLD: 10 FL — SIGNIFICANT CHANGE UP (ref 7.4–10.4)
POTASSIUM SERPL-MCNC: 4.6 MMOL/L — SIGNIFICANT CHANGE UP (ref 3.5–5)
POTASSIUM SERPL-SCNC: 4.6 MMOL/L — SIGNIFICANT CHANGE UP (ref 3.5–5)
PROT SERPL-MCNC: 7.5 G/DL — SIGNIFICANT CHANGE UP (ref 6–8)
PROT UR-MCNC: NEGATIVE MG/DL — SIGNIFICANT CHANGE UP
PROTHROM AB SERPL-ACNC: 13.9 SEC — HIGH (ref 9.95–12.87)
RBC # BLD: 3.76 M/UL — LOW (ref 4.2–5.4)
RBC # FLD: 13.2 % — SIGNIFICANT CHANGE UP (ref 11.5–14.5)
SODIUM SERPL-SCNC: 139 MMOL/L — SIGNIFICANT CHANGE UP (ref 135–146)
SP GR SPEC: >1.03 — HIGH (ref 1–1.03)
UROBILINOGEN FLD QL: 1 MG/DL — SIGNIFICANT CHANGE UP (ref 0.2–1)
WBC # BLD: 13.72 K/UL — HIGH (ref 4.8–10.8)
WBC # FLD AUTO: 13.72 K/UL — HIGH (ref 4.8–10.8)

## 2025-02-23 PROCEDURE — 85027 COMPLETE CBC AUTOMATED: CPT

## 2025-02-23 PROCEDURE — 74177 CT ABD & PELVIS W/CONTRAST: CPT | Mod: 26

## 2025-02-23 PROCEDURE — 97116 GAIT TRAINING THERAPY: CPT | Mod: GP

## 2025-02-23 PROCEDURE — 83735 ASSAY OF MAGNESIUM: CPT

## 2025-02-23 PROCEDURE — 99285 EMERGENCY DEPT VISIT HI MDM: CPT | Mod: FS

## 2025-02-23 PROCEDURE — 97162 PT EVAL MOD COMPLEX 30 MIN: CPT | Mod: GP

## 2025-02-23 PROCEDURE — C1889: CPT

## 2025-02-23 PROCEDURE — 88307 TISSUE EXAM BY PATHOLOGIST: CPT

## 2025-02-23 PROCEDURE — 80048 BASIC METABOLIC PNL TOTAL CA: CPT

## 2025-02-23 PROCEDURE — 86901 BLOOD TYPING SEROLOGIC RH(D): CPT

## 2025-02-23 PROCEDURE — 36415 COLL VENOUS BLD VENIPUNCTURE: CPT

## 2025-02-23 PROCEDURE — 36410 VNPNXR 3YR/> PHY/QHP DX/THER: CPT | Mod: GC

## 2025-02-23 PROCEDURE — 83605 ASSAY OF LACTIC ACID: CPT

## 2025-02-23 PROCEDURE — 82962 GLUCOSE BLOOD TEST: CPT

## 2025-02-23 PROCEDURE — 84100 ASSAY OF PHOSPHORUS: CPT

## 2025-02-23 PROCEDURE — 71045 X-RAY EXAM CHEST 1 VIEW: CPT

## 2025-02-23 PROCEDURE — C1751: CPT

## 2025-02-23 PROCEDURE — C9399: CPT

## 2025-02-23 PROCEDURE — 86900 BLOOD TYPING SEROLOGIC ABO: CPT

## 2025-02-23 PROCEDURE — 86850 RBC ANTIBODY SCREEN: CPT

## 2025-02-23 PROCEDURE — 99223 1ST HOSP IP/OBS HIGH 75: CPT

## 2025-02-23 PROCEDURE — 85025 COMPLETE CBC W/AUTO DIFF WBC: CPT

## 2025-02-23 PROCEDURE — 83036 HEMOGLOBIN GLYCOSYLATED A1C: CPT

## 2025-02-23 PROCEDURE — 93005 ELECTROCARDIOGRAM TRACING: CPT

## 2025-02-23 PROCEDURE — 85730 THROMBOPLASTIN TIME PARTIAL: CPT

## 2025-02-23 PROCEDURE — 85610 PROTHROMBIN TIME: CPT

## 2025-02-23 RX ORDER — METRONIDAZOLE 250 MG
500 TABLET ORAL ONCE
Refills: 0 | Status: COMPLETED | OUTPATIENT
Start: 2025-02-23 | End: 2025-02-23

## 2025-02-23 RX ORDER — B1/B2/B3/B5/B6/B12/VIT C/FOLIC 500-0.5 MG
1 TABLET ORAL
Refills: 0 | DISCHARGE

## 2025-02-23 RX ORDER — ENOXAPARIN SODIUM 100 MG/ML
40 INJECTION SUBCUTANEOUS EVERY 24 HOURS
Refills: 0 | Status: DISCONTINUED | OUTPATIENT
Start: 2025-02-23 | End: 2025-02-25

## 2025-02-23 RX ORDER — KETOROLAC TROMETHAMINE 30 MG/ML
15 INJECTION, SOLUTION INTRAMUSCULAR; INTRAVENOUS EVERY 6 HOURS
Refills: 0 | Status: DISCONTINUED | OUTPATIENT
Start: 2025-02-23 | End: 2025-02-25

## 2025-02-23 RX ORDER — ONDANSETRON HCL/PF 4 MG/2 ML
4 VIAL (ML) INJECTION EVERY 6 HOURS
Refills: 0 | Status: DISCONTINUED | OUTPATIENT
Start: 2025-02-23 | End: 2025-02-25

## 2025-02-23 RX ORDER — SODIUM CHLORIDE 9 G/1000ML
2000 INJECTION, SOLUTION INTRAVENOUS ONCE
Refills: 0 | Status: COMPLETED | OUTPATIENT
Start: 2025-02-23 | End: 2025-02-23

## 2025-02-23 RX ORDER — ACETAMINOPHEN 500 MG/5ML
650 LIQUID (ML) ORAL EVERY 6 HOURS
Refills: 0 | Status: DISCONTINUED | OUTPATIENT
Start: 2025-02-23 | End: 2025-02-24

## 2025-02-23 RX ORDER — SODIUM CHLORIDE 9 G/1000ML
1000 INJECTION, SOLUTION INTRAVENOUS
Refills: 0 | Status: DISCONTINUED | OUTPATIENT
Start: 2025-02-23 | End: 2025-02-25

## 2025-02-23 RX ORDER — PIPERACILLIN-TAZO-DEXTROSE,ISO 3.375G/5
3.38 IV SOLUTION, PIGGYBACK PREMIX FROZEN(ML) INTRAVENOUS EVERY 8 HOURS
Refills: 0 | Status: DISCONTINUED | OUTPATIENT
Start: 2025-02-23 | End: 2025-02-25

## 2025-02-23 RX ORDER — CIPROFLOXACIN HCL 250 MG
400 TABLET ORAL ONCE
Refills: 0 | Status: COMPLETED | OUTPATIENT
Start: 2025-02-23 | End: 2025-02-23

## 2025-02-23 RX ORDER — B1/B2/B3/B5/B6/B12/VIT C/FOLIC 500-0.5 MG
1 TABLET ORAL DAILY
Refills: 0 | Status: DISCONTINUED | OUTPATIENT
Start: 2025-02-23 | End: 2025-02-25

## 2025-02-23 RX ADMIN — Medication 4 MILLIGRAM(S): at 06:17

## 2025-02-23 RX ADMIN — SODIUM CHLORIDE 1000 MILLILITER(S): 9 INJECTION, SOLUTION INTRAVENOUS at 11:52

## 2025-02-23 RX ADMIN — KETOROLAC TROMETHAMINE 15 MILLIGRAM(S): 30 INJECTION, SOLUTION INTRAMUSCULAR; INTRAVENOUS at 12:02

## 2025-02-23 RX ADMIN — Medication 25 GRAM(S): at 14:27

## 2025-02-23 RX ADMIN — Medication 650 MILLIGRAM(S): at 14:33

## 2025-02-23 RX ADMIN — Medication 1 TABLET(S): at 12:02

## 2025-02-23 RX ADMIN — Medication 25 GRAM(S): at 21:40

## 2025-02-23 RX ADMIN — Medication 100 MILLIGRAM(S): at 12:02

## 2025-02-23 RX ADMIN — Medication 1000 MILLILITER(S): at 06:16

## 2025-02-23 RX ADMIN — SODIUM CHLORIDE 115 MILLILITER(S): 9 INJECTION, SOLUTION INTRAVENOUS at 18:08

## 2025-02-23 RX ADMIN — Medication 200 MILLIGRAM(S): at 09:17

## 2025-02-23 NOTE — ED ADULT TRIAGE NOTE - PATIENT ON (OXYGEN DELIVERY METHOD)
URGENT CARE DEPARTMENT ENCOUNTER      CHIEF COMPLAINT    Chief Complaint   Patient presents with     Symptoms       HISTORY OF PRESENT ILLNESS    Sandhya Reynoso is a pleasant 42 year old female who presents to the [facility] urgent [facility] alone for evaluation and treatment of urinary tract symptoms.  Patient states she for the past 2 days she has had some dysuria and pelvic pressure.  States distant history of UTI in the past.    ALLERGIES    ALLERGIES:   Allergen Reactions    Penicillins HIVES    Morphine RASH       CURRENT MEDICATIONS    Current Outpatient Medications   Medication Sig Dispense Refill    buPROPion (WELLBUTRIN) 75 MG tablet Take 0.5 Tablets by mouth daily for 7 days, THEN 0.5 Tablets 2 (two) times daily for 7 days, THEN 1 Tablet 2 (two) times daily for 14 days.      nitrofurantoin, macrocrystal-monohydrate, (MACROBID) 100 MG capsule Take 1 capsule by mouth in the morning and 1 capsule in the evening. Do all this for 5 days. 10 capsule 0    norgestimate-ethinyl estradiol, triphasic, (ORTHO TRI-CYCLEN) 0.18/0.215/0.25 MG-35 MCG tablet Take 1 tablet by mouth daily. (Patient not taking: Reported on 10/7/2024) 84 tablet 3    ibuprofen (MOTRIN) 200 MG tablet Take 200 mg by mouth every 6 hours as needed.       No current facility-administered medications for this visit.       REVIEW OF SYSTEMS    Pertinent review systems noted in past medical history    PHYSICAL EXAM    Vitals:    10/07/24 1440   BP: 114/75   Pulse: 71   Resp: 16   Temp: 97.5 °F (36.4 °C)   SpO2: 100%   LMP: 09/25/2024     Constitutional:  Well developed, well nourished. No acute distress, non-toxic appearance.   Respiratory:  No respiratory distress. Lungs are clear to auscultation bilaterally. No rales or wheezing appreciated. No stridor.   Neurologic:  Alert & oriented to conversation. Moves all extremities. Speech is clear.   Psychiatric:  Speech and behavior appropriate.   LABS    Recent Results (from the  past 8 hour(s))   POCT Urine Dip Auto    Collection Time: 10/07/24  2:52 PM   Result Value Ref Range    POCT Color Safia Safia, Brown, Orange, Pink, Red, Straw, Yellow, Green, Blue, Colorless, Other    POCT Appearance Clear Clear, Cloudy, Hazy, Turbid    POCT Glucose Urine Negative Negative mg/dL    POCT Bilirubin Negative Negative    POCT Ketones Negative Negative mg/dL    POCT Specific Gravity >= 1.030 (A) 1.000, 1.005, 1.010, 1.015, 1.020, 1.025, 1.030, <= 1.005    POCT Occult Blood Small (A) Negative    POCT pH 5.5 5.0, 5.5, 6.0, 6.5, 7.0, 7.5, 8.0, 8.5    POCT Protein Negative Negative mg/dL    POCT Urobilinogen 0.2 0.2, 1.0 mg/dL    Urine Nitrite Negative Negative    WBC (Leukocyte) Esterase POC Small (A) Negative   UC COURSE & MEDICAL DECISION MAKING    42 year old female who presents with urinary tract symptoms  DDX: includes cystitis, urethritis, pyelonephritis, vaginitis, referral syndrome, genital herpes.   H&P consistent with urinary tract infection.  IMPRESSION  Diagnosis:  The encounter diagnosis was  (genitourinary) symptoms.    PLAN  Orders Placed This Encounter    POCT Urine Dip Auto    nitrofurantoin, macrocrystal-monohydrate, (MACROBID) 100 MG capsule   Push fluids.  2. Treatment plan to include conservative supportive measures  3. All questions and concerns addressed.  4. Return precautions discussed.  Follow-up with family practice in the next few days, if sx persist or ED/UC if sx worsen.   5. Patient discharged in stable condition.      Stone Meyers PA-C, MPAS  [facility]           room air

## 2025-02-23 NOTE — ED PROVIDER NOTE - CLINICAL SUMMARY MEDICAL DECISION MAKING FREE TEXT BOX
Patient resents left lower quadrant pain.  Found to have perforated diverticulitis.  Surgical team consulted.  Given IV antibiotics and fluids.  Patient otherwise stable.  Pain well-controlled.  Admission to surgical team after discussion

## 2025-02-23 NOTE — H&P ADULT - ASSESSMENT
ASSESSMENT:  Pt is a 70y female PMHx of breast cancer, diverticulitis. PSHx of hysterectomy. Pt presents with LLQ pain that occurred a few hours after eating last night follow by a few episodes of nbnb diarrhea. Denies nausea, vomiting, fever, chills. Pt endorses pain is similar to her previous episodes of diverticulitis. Pt was admitted to surgery service in 08/2024 for management of sigmoid diverticulitis in which she did well on a course of IV antibiotics, and diet. After discharge, pt underwent colonoscopy which was normal. CT A/P show 4.5 x 2.9 cm focus of gas with a small air-fluid level seen adjacent to the sigmoid colon suggestive of a contained perforation. Mild adjacent fat stranding, which may be related to underlying diverticulitis. Labs significant for WBC 13.72. Vitals , /89, Temperature 99.9. Physical exam findings, imaging, and labs as documented above.     PLAN:  - IV Zosyn  - IR consult   - serial abdominal exams  - trend WBC, fevers  - NPO except meds  - IVF    Lines : PIV     Above plan discussed with Attending Surgeon Dr. Stein  , patient, patient family, and ED team  02-23-25 @ 10:08   ASSESSMENT:  Pt is a 70y female PMHx of breast cancer, diverticulitis. PSHx of hysterectomy. Pt presents with LLQ pain that occurred a few hours after eating last night follow by a few episodes of nbnb diarrhea. Denies nausea, vomiting, fever, chills. Pt endorses pain is similar to her previous episodes of diverticulitis. Pt was admitted to surgery service in 08/2024 for management of sigmoid diverticulitis in which she did well on a course of IV antibiotics, and diet. After discharge, pt underwent colonoscopy which was normal. CT A/P show 4.5 x 2.9 cm focus of gas with a small air-fluid level seen adjacent to the sigmoid colon suggestive of a contained perforation. Mild adjacent fat stranding, which may be related to underlying diverticulitis. Labs significant for WBC 13.72. Vitals , /89, Temperature 99.9. Physical exam findings, imaging, and labs as documented above.     PLAN:  - IV Zosyn  - IR consult   - ID consult   - serial abdominal exams  - trend WBC, fevers  - NPO except meds  - IVF    Lines : PIV     Above plan discussed with Attending Surgeon Dr. Stein  , patient, patient family, and ED team  02-23-25 @ 10:08

## 2025-02-23 NOTE — H&P ADULT - NSHPPHYSICALEXAM_GEN_ALL_CORE
VITALS:  T(F): 99.9 (02-23-25 @ 07:45), Max: 99.9 (02-23-25 @ 07:45)  HR: 119 (02-23-25 @ 07:45) (102 - 119)  BP: 126/69 (02-23-25 @ 07:45) (122/77 - 126/69)  RR: 19 (02-23-25 @ 07:45) (19 - 19)  SpO2: 96% (02-23-25 @ 07:45) (96% - 97%)    PHYSICAL EXAM:  General: NAD,  calm and cooperative  Cardiac: RRR   Respiratory: CTAB, normal respiratory effort, on room air  Abdomen: Soft, non-distended, no rebound, no guarding; +LLQ tenderness to palpitation   Musculoskeletal: ROM intact, compartments soft  Skin: Warm/dry, normal color

## 2025-02-23 NOTE — H&P ADULT - ATTENDING COMMENTS
Trauma/Acute Care Surgery Attending Note Attestation  Patient was seen and examined bedside on 2/23.  I reviewed the resident/PA note and agreed with above assessment and plan with following additions and corrections.    History as above. Previously admitted last year for diverticulitis.    Vital signs reviewed.    I independently performed a medically appropriate exam. The exam was notable for LLQ tenderness to palpation. No rebound tenderness or guarding.                           11.3   13.72 )-----------( 372      ( 23 Feb 2025 06:15 )             34.6     02-23    139  |  102  |  9[L]  ----------------------------<  118[H]  4.6   |  26  |  0.8    Ca 9.6; Mg x ; Phos x       ( 23 Feb 2025 06:15 )  Alb: 4.2 g/dL / Pro: 7.5 g/dL / AlkPhos: 109 U/L / ALT: 7 U/L / AST: 14 U/L / GGT:x     / Tbili 0.3 mg/dL/ Dbili <0.2 mg/dL / Indbili >0.1 mg/dL    PT/INR/PTT - ( 23 Feb 2025 20:00 )   PT: 13.90 sec; INR: 1.17 ratio; PTT 34.4 sec         Lactate, Blood: 1.5 mmol/L (02-23 @ 06:15)    CT A/P reviewed and interpreted by me: sigmoid diverticulitis with contained perforation and 4.5 cm pericolonic abscess    Assessment/Plan:  70y Female PMH breast cancer, diverticulitis who presents with sigmoid diverticulitis with contained perforation and 4.5 cm pericolonic abscess. No acute surgical intervention. Admit to surgery. Keep NPO with IVF. Zosyn for antibiotics. IR consult for evaluation for percutaneous drainage. ID consult for assistance with antibiotics as patient has been treated in the past for diverticulitis. Monitor abdominal exam.    Jj Stein MD  Trauma/Acute Care Surgery/Surgical Critical Care Attending

## 2025-02-23 NOTE — H&P ADULT - NSHPLABSRESULTS_GEN_ALL_CORE
LAB/STUDIES:                        11.3   13.72 )-----------( 372      ( 23 Feb 2025 06:15 )             34.6     02-23    139  |  102  |  9[L]  ----------------------------<  118[H]  4.6   |  26  |  0.8    Ca    9.6      23 Feb 2025 06:15    TPro  7.5  /  Alb  4.2  /  TBili  0.3  /  DBili  <0.2  /  AST  14  /  ALT  7   /  AlkPhos  109  02-23      LIVER FUNCTIONS - ( 23 Feb 2025 06:15 )  Alb: 4.2 g/dL / Pro: 7.5 g/dL / ALK PHOS: 109 U/L / ALT: 7 U/L / AST: 14 U/L / GGT: x           Urinalysis Basic - ( 23 Feb 2025 09:25 )    Color: Yellow / Appearance: Clear / SG: >1.030 / pH: x  Gluc: x / Ketone: Negative mg/dL  / Bili: Negative / Urobili: 1.0 mg/dL   Blood: x / Protein: Negative mg/dL / Nitrite: Negative   Leuk Esterase: Negative / RBC: x / WBC x   Sq Epi: x / Non Sq Epi: x / Bacteria: x    Imaging:  < from: CT Abdomen and Pelvis w/ IV Cont (02.23.25 @ 08:53) >    IMPRESSION:  4.5 x 2.9 cm focus of gas with a small air-fluid level seen adjacent to   the sigmoid colon suggestive of a contained perforation. Mild adjacent   fat stranding, which may be related to underlying diverticulitis.    < end of copied text >

## 2025-02-23 NOTE — H&P ADULT - HISTORY OF PRESENT ILLNESS
Pt is a 70y female PMHx of breast cancer, diverticulitis. PSHx of hysterectomy. Pt presents with LLQ pain that occurred a few hours after eating last night follow by a few episodes of nbnb diarrhea. Denies nausea, vomiting, fever, chills. Pt endorses pain is similar to her previous episodes of diverticulitis. Pt was admitted to surgery service in 08/2024 for management of sigmoid diverticulitis in which she did well on a course of IV antibiotics, and diet. After discharge, pt underwent colonoscopy which was normal. CT A/P show 4.5 x 2.9 cm focus of gas with a small air-fluid level seen adjacent to the sigmoid colon suggestive of a contained perforation. Mild adjacent fat stranding, which may be related to underlying diverticulitis. Labs significant for WBC 13.72. Vitals , /89, Temperature 99.9.

## 2025-02-23 NOTE — ED PROVIDER NOTE - PROGRESS NOTE DETAILS
Patient noted to be tachycardic, oral temp 99.9.  White cell count is 14.  Concern for intra-abdominal infection IV antibiotics ordered.  Lactate negative.

## 2025-02-23 NOTE — ED PROVIDER NOTE - OBJECTIVE STATEMENT
70-year-old female history of breast cancer in remission, hysterectomy for fibroids, diverticulitis presenting to ER with 1 day of left lower quadrant pain.  Pain is sharp constant without radiation.  Had 1 episode of watery nonbloody diarrhea last night.  States symptoms feel similar to prior diverticulitis.  No fever, chills, nausea, vomiting, flank pain, urinary symptoms, bloody stools.

## 2025-02-23 NOTE — ED PROVIDER NOTE - PHYSICAL EXAMINATION
CONSTITUTIONAL: Well-appearing; well-nourished; in no apparent distress.   NECK: Supple; non-tender; no cervical lymphadenopathy.   CARDIOVASCULAR: Normal S1, S2; no murmurs, rubs, or gallops.   RESPIRATORY: Normal chest excursion with respiration; breath sounds clear and equal bilaterally; no wheezes, rhonchi, or rales.  GI/: Normal bowel sounds; non-distended; + ttp to LLQ. No rebound or guarding,  MS: No evidence of trauma or deformity.  Normal ROM in all four extremities; non-tender to palpation; distal pulses are normal.   SKIN: Normal for age and race; warm; dry; good turgor; no apparent lesions or exudate.   NEURO/PSYCH: A & O x 4; grossly unremarkable. mood and manner are appropriate. Grooming and personal hygiene are appropriate. No apparent thoughts of harm to self or others.

## 2025-02-23 NOTE — ED PROVIDER NOTE - ATTENDING APP SHARED VISIT CONTRIBUTION OF CARE
70-year-old female with history of diverticulitis presents with 1 day of left lower quadrant abdominal pain.  No fever, vomiting, blood in stool.  On exam left lower quadrant tenderness, moderate, no peritonitis.  Disposition pending workup and reassessment

## 2025-02-24 LAB
ANION GAP SERPL CALC-SCNC: 10 MMOL/L — SIGNIFICANT CHANGE UP (ref 7–14)
ANION GAP SERPL CALC-SCNC: 14 MMOL/L — SIGNIFICANT CHANGE UP (ref 7–14)
APTT BLD: 33.2 SEC — SIGNIFICANT CHANGE UP (ref 27–39.2)
APTT BLD: 33.4 SEC — SIGNIFICANT CHANGE UP (ref 27–39.2)
BASOPHILS # BLD AUTO: 0.04 K/UL — SIGNIFICANT CHANGE UP (ref 0–0.2)
BASOPHILS # BLD AUTO: 0.06 K/UL — SIGNIFICANT CHANGE UP (ref 0–0.2)
BASOPHILS NFR BLD AUTO: 0.2 % — SIGNIFICANT CHANGE UP (ref 0–1)
BASOPHILS NFR BLD AUTO: 0.3 % — SIGNIFICANT CHANGE UP (ref 0–1)
BUN SERPL-MCNC: 6 MG/DL — LOW (ref 10–20)
BUN SERPL-MCNC: 8 MG/DL — LOW (ref 10–20)
CALCIUM SERPL-MCNC: 8.8 MG/DL — SIGNIFICANT CHANGE UP (ref 8.4–10.5)
CALCIUM SERPL-MCNC: 8.8 MG/DL — SIGNIFICANT CHANGE UP (ref 8.4–10.5)
CHLORIDE SERPL-SCNC: 100 MMOL/L — SIGNIFICANT CHANGE UP (ref 98–110)
CHLORIDE SERPL-SCNC: 101 MMOL/L — SIGNIFICANT CHANGE UP (ref 98–110)
CO2 SERPL-SCNC: 23 MMOL/L — SIGNIFICANT CHANGE UP (ref 17–32)
CO2 SERPL-SCNC: 24 MMOL/L — SIGNIFICANT CHANGE UP (ref 17–32)
CREAT SERPL-MCNC: 0.8 MG/DL — SIGNIFICANT CHANGE UP (ref 0.7–1.5)
CREAT SERPL-MCNC: 0.8 MG/DL — SIGNIFICANT CHANGE UP (ref 0.7–1.5)
EGFR: 78 ML/MIN/1.73M2 — SIGNIFICANT CHANGE UP
EOSINOPHIL # BLD AUTO: 0.03 K/UL — SIGNIFICANT CHANGE UP (ref 0–0.7)
EOSINOPHIL # BLD AUTO: 0.07 K/UL — SIGNIFICANT CHANGE UP (ref 0–0.7)
EOSINOPHIL NFR BLD AUTO: 0.1 % — SIGNIFICANT CHANGE UP (ref 0–8)
EOSINOPHIL NFR BLD AUTO: 0.3 % — SIGNIFICANT CHANGE UP (ref 0–8)
GLUCOSE SERPL-MCNC: 101 MG/DL — HIGH (ref 70–99)
GLUCOSE SERPL-MCNC: 78 MG/DL — SIGNIFICANT CHANGE UP (ref 70–99)
HCT VFR BLD CALC: 30.5 % — LOW (ref 37–47)
HCT VFR BLD CALC: 32 % — LOW (ref 37–47)
HGB BLD-MCNC: 10.2 G/DL — LOW (ref 12–16)
HGB BLD-MCNC: 10.5 G/DL — LOW (ref 12–16)
IMM GRANULOCYTES NFR BLD AUTO: 0.5 % — HIGH (ref 0.1–0.3)
IMM GRANULOCYTES NFR BLD AUTO: 0.7 % — HIGH (ref 0.1–0.3)
INR BLD: 1.61 RATIO — HIGH (ref 0.65–1.3)
INR BLD: 1.65 RATIO — HIGH (ref 0.65–1.3)
LACTATE SERPL-SCNC: 1 MMOL/L — SIGNIFICANT CHANGE UP (ref 0.7–2)
LYMPHOCYTES # BLD AUTO: 1.72 K/UL — SIGNIFICANT CHANGE UP (ref 1.2–3.4)
LYMPHOCYTES # BLD AUTO: 1.92 K/UL — SIGNIFICANT CHANGE UP (ref 1.2–3.4)
LYMPHOCYTES # BLD AUTO: 6.9 % — LOW (ref 20.5–51.1)
LYMPHOCYTES # BLD AUTO: 8.6 % — LOW (ref 20.5–51.1)
MAGNESIUM SERPL-MCNC: 1.3 MG/DL — LOW (ref 1.8–2.4)
MAGNESIUM SERPL-MCNC: 2 MG/DL — SIGNIFICANT CHANGE UP (ref 1.8–2.4)
MCHC RBC-ENTMCNC: 30.1 PG — SIGNIFICANT CHANGE UP (ref 27–31)
MCHC RBC-ENTMCNC: 30.4 PG — SIGNIFICANT CHANGE UP (ref 27–31)
MCHC RBC-ENTMCNC: 32.8 G/DL — SIGNIFICANT CHANGE UP (ref 32–37)
MCHC RBC-ENTMCNC: 33.4 G/DL — SIGNIFICANT CHANGE UP (ref 32–37)
MCV RBC AUTO: 90.8 FL — SIGNIFICANT CHANGE UP (ref 81–99)
MCV RBC AUTO: 91.7 FL — SIGNIFICANT CHANGE UP (ref 81–99)
MONOCYTES # BLD AUTO: 1.48 K/UL — HIGH (ref 0.1–0.6)
MONOCYTES # BLD AUTO: 2.42 K/UL — HIGH (ref 0.1–0.6)
MONOCYTES NFR BLD AUTO: 6.6 % — SIGNIFICANT CHANGE UP (ref 1.7–9.3)
MONOCYTES NFR BLD AUTO: 9.8 % — HIGH (ref 1.7–9.3)
NEUTROPHILS # BLD AUTO: 18.73 K/UL — HIGH (ref 1.4–6.5)
NEUTROPHILS # BLD AUTO: 20.4 K/UL — HIGH (ref 1.4–6.5)
NEUTROPHILS NFR BLD AUTO: 82.3 % — HIGH (ref 42.2–75.2)
NEUTROPHILS NFR BLD AUTO: 83.7 % — HIGH (ref 42.2–75.2)
NRBC BLD AUTO-RTO: 0 /100 WBCS — SIGNIFICANT CHANGE UP (ref 0–0)
NRBC BLD AUTO-RTO: 0 /100 WBCS — SIGNIFICANT CHANGE UP (ref 0–0)
PHOSPHATE SERPL-MCNC: 2.8 MG/DL — SIGNIFICANT CHANGE UP (ref 2.1–4.9)
PHOSPHATE SERPL-MCNC: 3.3 MG/DL — SIGNIFICANT CHANGE UP (ref 2.1–4.9)
PLATELET # BLD AUTO: 317 K/UL — SIGNIFICANT CHANGE UP (ref 130–400)
PLATELET # BLD AUTO: 346 K/UL — SIGNIFICANT CHANGE UP (ref 130–400)
PMV BLD: 10.3 FL — SIGNIFICANT CHANGE UP (ref 7.4–10.4)
PMV BLD: 9.7 FL — SIGNIFICANT CHANGE UP (ref 7.4–10.4)
POTASSIUM SERPL-MCNC: 3.5 MMOL/L — SIGNIFICANT CHANGE UP (ref 3.5–5)
POTASSIUM SERPL-MCNC: 3.7 MMOL/L — SIGNIFICANT CHANGE UP (ref 3.5–5)
POTASSIUM SERPL-SCNC: 3.5 MMOL/L — SIGNIFICANT CHANGE UP (ref 3.5–5)
POTASSIUM SERPL-SCNC: 3.7 MMOL/L — SIGNIFICANT CHANGE UP (ref 3.5–5)
PROTHROM AB SERPL-ACNC: 19.2 SEC — HIGH (ref 9.95–12.87)
PROTHROM AB SERPL-ACNC: 19.7 SEC — HIGH (ref 9.95–12.87)
RBC # BLD: 3.36 M/UL — LOW (ref 4.2–5.4)
RBC # BLD: 3.49 M/UL — LOW (ref 4.2–5.4)
RBC # FLD: 13 % — SIGNIFICANT CHANGE UP (ref 11.5–14.5)
RBC # FLD: 13 % — SIGNIFICANT CHANGE UP (ref 11.5–14.5)
SODIUM SERPL-SCNC: 134 MMOL/L — LOW (ref 135–146)
SODIUM SERPL-SCNC: 138 MMOL/L — SIGNIFICANT CHANGE UP (ref 135–146)
WBC # BLD: 22.37 K/UL — HIGH (ref 4.8–10.8)
WBC # BLD: 24.78 K/UL — HIGH (ref 4.8–10.8)
WBC # FLD AUTO: 22.37 K/UL — HIGH (ref 4.8–10.8)
WBC # FLD AUTO: 24.78 K/UL — HIGH (ref 4.8–10.8)

## 2025-02-24 PROCEDURE — 93010 ELECTROCARDIOGRAM REPORT: CPT

## 2025-02-24 PROCEDURE — 99233 SBSQ HOSP IP/OBS HIGH 50: CPT | Mod: 57

## 2025-02-24 PROCEDURE — 71045 X-RAY EXAM CHEST 1 VIEW: CPT | Mod: 26

## 2025-02-24 RX ORDER — SODIUM CHLORIDE 9 G/1000ML
500 INJECTION, SOLUTION INTRAVENOUS ONCE
Refills: 0 | Status: COMPLETED | OUTPATIENT
Start: 2025-02-24 | End: 2025-02-25

## 2025-02-24 RX ORDER — SODIUM CHLORIDE 9 G/1000ML
500 INJECTION, SOLUTION INTRAVENOUS ONCE
Refills: 0 | Status: COMPLETED | OUTPATIENT
Start: 2025-02-24 | End: 2025-02-24

## 2025-02-24 RX ORDER — MAGNESIUM SULFATE 500 MG/ML
2 SYRINGE (ML) INJECTION ONCE
Refills: 0 | Status: COMPLETED | OUTPATIENT
Start: 2025-02-24 | End: 2025-02-24

## 2025-02-24 RX ORDER — ACETAMINOPHEN 500 MG/5ML
650 LIQUID (ML) ORAL EVERY 6 HOURS
Refills: 0 | Status: DISCONTINUED | OUTPATIENT
Start: 2025-02-24 | End: 2025-02-25

## 2025-02-24 RX ADMIN — Medication 25 GRAM(S): at 21:50

## 2025-02-24 RX ADMIN — Medication 25 GRAM(S): at 14:37

## 2025-02-24 RX ADMIN — Medication 650 MILLIGRAM(S): at 06:33

## 2025-02-24 RX ADMIN — KETOROLAC TROMETHAMINE 15 MILLIGRAM(S): 30 INJECTION, SOLUTION INTRAMUSCULAR; INTRAVENOUS at 08:10

## 2025-02-24 RX ADMIN — Medication 25 GRAM(S): at 17:05

## 2025-02-24 RX ADMIN — KETOROLAC TROMETHAMINE 15 MILLIGRAM(S): 30 INJECTION, SOLUTION INTRAMUSCULAR; INTRAVENOUS at 08:40

## 2025-02-24 RX ADMIN — Medication 25 GRAM(S): at 05:25

## 2025-02-24 RX ADMIN — Medication 650 MILLIGRAM(S): at 00:28

## 2025-02-24 RX ADMIN — ENOXAPARIN SODIUM 40 MILLIGRAM(S): 100 INJECTION SUBCUTANEOUS at 11:36

## 2025-02-24 RX ADMIN — Medication 650 MILLIGRAM(S): at 05:24

## 2025-02-24 RX ADMIN — SODIUM CHLORIDE 115 MILLILITER(S): 9 INJECTION, SOLUTION INTRAVENOUS at 09:32

## 2025-02-24 RX ADMIN — SODIUM CHLORIDE 500 MILLILITER(S): 9 INJECTION, SOLUTION INTRAVENOUS at 09:33

## 2025-02-24 RX ADMIN — Medication 1 TABLET(S): at 11:36

## 2025-02-24 NOTE — PATIENT PROFILE ADULT - FALL HARM RISK - HARM RISK INTERVENTIONS

## 2025-02-24 NOTE — PROGRESS NOTE ADULT - SUBJECTIVE AND OBJECTIVE BOX
GENERAL SURGERY PROGRESS NOTE     JOSE DE JESUS MYLES  70y  Female  Hospital day :1d  24-HOUR EVENTS:  Overnight tachycardic to 108, 9AM reported to be tachycardic to 120, 1.5L bolus administered.    T(F): 99.7 (02-24-25 @ 01:15), Max: 99.7 (02-24-25 @ 01:15)  HR: 108 (02-24-25 @ 01:15) (92 - 108)  BP: 146/78 (02-24-25 @ 01:15) (123/54 - 146/78)  ABP: --  ABP(mean): --  RR: 18 (02-24-25 @ 01:15) (18 - 18)  SpO2: 97% (02-24-25 @ 01:15) (97% - 99%)    DIET/FLUIDS: lactated ringers. 1000 milliLiter(s) IV Continuous <Continuous>  multivitamin 1 Tablet(s) Oral daily    AC/ proph: enoxaparin Injectable 40 milliGRAM(s) SubCutaneous every 24 hours    ABx: piperacillin/tazobactam IVPB.. 3.375 Gram(s) IV Intermittent every 8 hours    PHYSICAL EXAM:  GENERAL: A&Ox3, NAD  HEENT: Normocephalic, atraumatic  PULM: Non-labored respirations, bilateral chest rise, saturating well on RA  CV: Regular rhythm  ABDOMEN: Abdomen soft, non-distended, LLQ tenderness, no rebound tenderness or guarding  MSK: Moving extremities spontaneously, BUE and BLE sensorimotor and strength intact  NEURO: No focal deficits  SKIN: Warm, dry, normal skin color, texture, turgor    LABS                  11.3   13.72 )-----------( 372      ( 23 Feb 2025 06:15 )             34.6       02-23    139  |  102  |  9[L]  ----------------------------<  118[H]  4.6   |  26  |  0.8      LFTs:             7.5  | 0.3  | 14       ------------------[109     ( 23 Feb 2025 06:15 )  4.2  | <0.2 | 7           Lipase:46     Amylase:x         Lactate, Blood: 1.5 mmol/L (02-23-25 @ 06:15)    Coags:     13.90  ----< 1.17    ( 23 Feb 2025 20:00 )     34.4      Urinalysis Basic - ( 23 Feb 2025 09:25 )    Color: Yellow / Appearance: Clear / SG: >1.030 / pH: x  Gluc: x / Ketone: Negative mg/dL  / Bili: Negative / Urobili: 1.0 mg/dL   Blood: x / Protein: Negative mg/dL / Nitrite: Negative   Leuk Esterase: Negative / RBC: x / WBC x   Sq Epi: x / Non Sq Epi: x / Bacteria: x    Urinalysis with Rflx Culture (collected 23 Feb 2025 09:25)    RADIOLOGY & ADDITIONAL TESTS:  < from: CT Abdomen and Pelvis w/ IV Cont (02.23.25 @ 08:53) >    ACC: [id number] EXAM:  CT ABDOMEN AND PELVIS IC   ORDERED BY: BEN HERNANDEZ     PROCEDURE DATE:  02/23/2025          INTERPRETATION:  CLINICAL INFORMATION: Left lower quadrant pain with   history of diverticulitis.    COMPARISON: 8/17/2024    CONTRAST/COMPLICATIONS:  IV Contrast: Omnipaque 350  100 cc administered   0 cc discarded  Oral Contrast: NONE  .    PROCEDURE:  CT of the Abdomen and Pelvis was performed.  Sagittal and coronal reformats were performed.    FINDINGS:  LOWER CHEST: Within normal limits.    LIVER: Within normal limits.  BILE DUCTS: Normal caliber.  GALLBLADDER: Within normal limits.  SPLEEN: Within normal limits.  PANCREAS: Within normal limits.  ADRENALS: Within normal limits.  KIDNEYS/URETERS: Within normal limits.    BLADDER: Within normal limits.  REPRODUCTIVE ORGANS: Hysterectomy.    BOWEL: 4.5 x 2.9 cm focus of gas with a small air-fluid level seen   adjacent to the sigmoid colon suggestive of a contained perforation. Mild   adjacent fat stranding, which may be related to underlying   diverticulitis. No bowel obstruction. Appendix is normal.  PERITONEUM/RETROPERITONEUM: Within normal limits.  VESSELS: Atherosclerotic changes.  LYMPH NODES: No lymphadenopathy.  ABDOMINAL WALL: Within normal limits.  BONES: Degenerative changes.    IMPRESSION:  4.5 x 2.9 cm focus of gas with a small air-fluid level seen adjacent to   the sigmoid colon suggestive of a contained perforation. Mild adjacent   fat stranding, which may be related to underlying diverticulitis.    < end of copied text >    ASSESSMENT:  70F PMHx breast cancer, diverticulitis (previously admitted 8/2024, nonoperative management, subsequent colonoscopy normal), PSHx hysterectomy who presented to the ED 2/23 with LLQ pain. CTAP demonstrated a 4.5 x 2.9 cm focus of gas with small air-fluid level adjacent to sigmoid colon, suggestive of contained perforation, along with fat stranding.    PLAN:  - Continue IV Zosyn  - F/u ID consult for abx assistance given prior treatment for diverticulitis  - F/u IR for evaluation for percutaneous drainage  - Booked for OR 2/25 for exploratory laparotomy, possible bowel resection, possible ostomy   - NPO, mIVF    Surgery ([id number])     GENERAL SURGERY PROGRESS NOTE     JOSE DE JESUS MYLES  70y  Female  Hospital day :1d  24-HOUR EVENTS:  Overnight tachycardic to 108, 9AM reported to be tachycardic to 120, 1.5L bolus administered.    T(F): 99.7 (02-24-25 @ 01:15), Max: 99.7 (02-24-25 @ 01:15)  HR: 108 (02-24-25 @ 01:15) (92 - 108)  BP: 146/78 (02-24-25 @ 01:15) (123/54 - 146/78)  ABP: --  ABP(mean): --  RR: 18 (02-24-25 @ 01:15) (18 - 18)  SpO2: 97% (02-24-25 @ 01:15) (97% - 99%)    DIET/FLUIDS: lactated ringers. 1000 milliLiter(s) IV Continuous <Continuous>  multivitamin 1 Tablet(s) Oral daily    AC/ proph: enoxaparin Injectable 40 milliGRAM(s) SubCutaneous every 24 hours    ABx: piperacillin/tazobactam IVPB.. 3.375 Gram(s) IV Intermittent every 8 hours    PHYSICAL EXAM:  GENERAL: A&Ox3, NAD  HEENT: Normocephalic, atraumatic  PULM: Non-labored respirations, bilateral chest rise, saturating well on RA  CV: Regular rhythm  ABDOMEN: Abdomen soft, non-distended, LLQ tenderness, no rebound tenderness or guarding  MSK: Moving extremities spontaneously, BUE and BLE sensorimotor and strength intact  NEURO: No focal deficits  SKIN: Warm, dry, normal skin color, texture, turgor    LABS                  11.3   13.72 )-----------( 372      ( 23 Feb 2025 06:15 )             34.6       02-23    139  |  102  |  9[L]  ----------------------------<  118[H]  4.6   |  26  |  0.8      LFTs:             7.5  | 0.3  | 14       ------------------[109     ( 23 Feb 2025 06:15 )  4.2  | <0.2 | 7           Lipase:46     Amylase:x         Lactate, Blood: 1.5 mmol/L (02-23-25 @ 06:15)    Coags:     13.90  ----< 1.17    ( 23 Feb 2025 20:00 )     34.4      Urinalysis Basic - ( 23 Feb 2025 09:25 )    Color: Yellow / Appearance: Clear / SG: >1.030 / pH: x  Gluc: x / Ketone: Negative mg/dL  / Bili: Negative / Urobili: 1.0 mg/dL   Blood: x / Protein: Negative mg/dL / Nitrite: Negative   Leuk Esterase: Negative / RBC: x / WBC x   Sq Epi: x / Non Sq Epi: x / Bacteria: x    Urinalysis with Rflx Culture (collected 23 Feb 2025 09:25)    RADIOLOGY & ADDITIONAL TESTS:  < from: CT Abdomen and Pelvis w/ IV Cont (02.23.25 @ 08:53) >    ACC: [id number] EXAM:  CT ABDOMEN AND PELVIS IC   ORDERED BY: BEN HERNANDEZ     PROCEDURE DATE:  02/23/2025          INTERPRETATION:  CLINICAL INFORMATION: Left lower quadrant pain with   history of diverticulitis.    COMPARISON: 8/17/2024    CONTRAST/COMPLICATIONS:  IV Contrast: Omnipaque 350  100 cc administered   0 cc discarded  Oral Contrast: NONE  .    PROCEDURE:  CT of the Abdomen and Pelvis was performed.  Sagittal and coronal reformats were performed.    FINDINGS:  LOWER CHEST: Within normal limits.    LIVER: Within normal limits.  BILE DUCTS: Normal caliber.  GALLBLADDER: Within normal limits.  SPLEEN: Within normal limits.  PANCREAS: Within normal limits.  ADRENALS: Within normal limits.  KIDNEYS/URETERS: Within normal limits.    BLADDER: Within normal limits.  REPRODUCTIVE ORGANS: Hysterectomy.    BOWEL: 4.5 x 2.9 cm focus of gas with a small air-fluid level seen   adjacent to the sigmoid colon suggestive of a contained perforation. Mild   adjacent fat stranding, which may be related to underlying   diverticulitis. No bowel obstruction. Appendix is normal.  PERITONEUM/RETROPERITONEUM: Within normal limits.  VESSELS: Atherosclerotic changes.  LYMPH NODES: No lymphadenopathy.  ABDOMINAL WALL: Within normal limits.  BONES: Degenerative changes.    IMPRESSION:  4.5 x 2.9 cm focus of gas with a small air-fluid level seen adjacent to   the sigmoid colon suggestive of a contained perforation. Mild adjacent   fat stranding, which may be related to underlying diverticulitis.    < end of copied text >    ASSESSMENT:  70F PMHx breast cancer, diverticulitis (previously admitted 8/2024, nonoperative management, subsequent colonoscopy normal), PSHx hysterectomy who presented to the ED 2/23 with LLQ pain. CTAP demonstrated a 4.5 x 2.9 cm focus of gas with small air-fluid level adjacent to sigmoid colon, suggestive of contained perforation, along with fat stranding.    PLAN:  - Continue IV Zosyn  - F/u ID consult for abx assistance given prior treatment for diverticulitis  - F/u IR for evaluation for percutaneous drainage  - Booked for OR 2/25 for exploratory laparotomy, possible bowel resection, possible ostomy      - Ostomy RN consulted for preoperative marking  - NPO, mIVF    Surgery ([id number])

## 2025-02-24 NOTE — CONSULT NOTE ADULT - SUBJECTIVE AND OBJECTIVE BOX
INTERVENTIONAL RADIOLOGY CONSULT:     Procedure Requested: Evaluation of sigmoid diverticulitis with contained perforation.     HPI:  Pt is a 70y female PMHx of breast cancer, diverticulitis. PSHx of hysterectomy. Pt presents with LLQ pain that occurred a few hours after eating last night follow by a few episodes of nbnb diarrhea. Denies nausea, vomiting, fever, chills. Pt endorses pain is similar to her previous episodes of diverticulitis. Pt was admitted to surgery service in 08/2024 for management of sigmoid diverticulitis in which she did well on a course of IV antibiotics, and diet. After discharge, pt underwent colonoscopy which was normal. CT A/P show 4.5 x 2.9 cm focus of gas with a small air-fluid level seen adjacent to the sigmoid colon suggestive of a contained perforation. Mild adjacent fat stranding, which may be related to underlying diverticulitis. Labs significant for WBC 13.72. Vitals , /89, Temperature 99.9.       (23 Feb 2025 09:58)      PAST MEDICAL & SURGICAL HISTORY:  Breast cancer      Diverticulitis      History of hysteroscopy      H/O: hysterectomy          MEDICATIONS  (STANDING):  enoxaparin Injectable 40 milliGRAM(s) SubCutaneous every 24 hours  lactated ringers. 1000 milliLiter(s) (115 mL/Hr) IV Continuous <Continuous>  multivitamin 1 Tablet(s) Oral daily  piperacillin/tazobactam IVPB.. 3.375 Gram(s) IV Intermittent every 8 hours    MEDICATIONS  (PRN):  acetaminophen     Tablet .. 650 milliGRAM(s) Oral every 6 hours PRN Temp greater or equal to 38C (100.4F), Mild Pain (1 - 3)  ketorolac   Injectable 15 milliGRAM(s) IV Push every 6 hours PRN Severe Pain (7 - 10)  ondansetron Injectable 4 milliGRAM(s) IV Push every 6 hours PRN Nausea      Allergies    adhesives (Other)  No Known Drug Allergies    Intolerances          FAMILY HISTORY:      Physical Exam:   Vital Signs Last 24 Hrs  T(C): 37.4 (24 Feb 2025 09:54), Max: 37.6 (24 Feb 2025 01:15)  T(F): 99.4 (24 Feb 2025 09:54), Max: 99.7 (24 Feb 2025 01:15)  HR: 118 (24 Feb 2025 09:54) (92 - 118)  BP: 135/69 (24 Feb 2025 09:54) (123/54 - 146/78)  BP(mean): 77 (23 Feb 2025 18:49) (77 - 77)  RR: 18 (24 Feb 2025 09:54) (18 - 18)  SpO2: 97% (24 Feb 2025 09:54) (97% - 99%)          Labs:                         11.3   13.72 )-----------( 372      ( 23 Feb 2025 06:15 )             34.6     02-23    139  |  102  |  9[L]  ----------------------------<  118[H]  4.6   |  26  |  0.8    Ca    9.6      23 Feb 2025 06:15    TPro  7.5  /  Alb  4.2  /  TBili  0.3  /  DBili  <0.2  /  AST  14  /  ALT  7   /  AlkPhos  109  02-23    PT/INR - ( 23 Feb 2025 20:00 )   PT: 13.90 sec;   INR: 1.17 ratio         PTT - ( 23 Feb 2025 20:00 )  PTT:34.4 sec    Pertinent labs:                      11.3   13.72 )-----------( 372      ( 23 Feb 2025 06:15 )             34.6       02-23    139  |  102  |  9[L]  ----------------------------<  118[H]  4.6   |  26  |  0.8    Ca    9.6      23 Feb 2025 06:15    TPro  7.5  /  Alb  4.2  /  TBili  0.3  /  DBili  <0.2  /  AST  14  /  ALT  7   /  AlkPhos  109  02-23      PT/INR - ( 23 Feb 2025 20:00 )   PT: 13.90 sec;   INR: 1.17 ratio         PTT - ( 23 Feb 2025 20:00 )  PTT:34.4 sec    Radiology & Additional Studies:     Radiology imaging reviewed.       ASSESSMENT AND PLAN:  70F PMHx breast cancer, diverticulitis (previously admitted 8/2024, nonoperative management, subsequent colonoscopy normal), PSHx hysterectomy who presented to the ED 2/23 with LLQ pain. CTAP demonstrated a 4.5 x 2.9 cm focus of gas with small air-fluid level adjacent to sigmoid colon, suggestive of contained perforation, along with fat stranding. IR consulted for evaluation of contained perforation.     - Left pelvic abscess is draped by bowel and not amenable to drainage.  - No IR intervention recommended at this time.    Please call Interventional Radiology with questions or concerns:   - M-F [phone]: [phone]   - All other hours: [phone]  
JOSE DE JESUS MYLES  70y, Female  Allergy: adhesives (Other)  No Known Drug Allergies      CHIEF COMPLAINT: contained perforated sigmoid diverticulitis (24 Feb 2025 10:43)      LOS  1d    HPI:  Pt is a 70y female PMHx of breast cancer, diverticulitis. PSHx of hysterectomy. Pt presents with LLQ pain that occurred a few hours after eating last night follow by a few episodes of nbnb diarrhea. Denies nausea, vomiting, fever, chills. Pt endorses pain is similar to her previous episodes of diverticulitis. Pt was admitted to surgery service in 08/2024 for management of sigmoid diverticulitis in which she did well on a course of IV antibiotics, and diet. After discharge, pt underwent colonoscopy which was normal. CT A/P show 4.5 x 2.9 cm focus of gas with a small air-fluid level seen adjacent to the sigmoid colon suggestive of a contained perforation. Mild adjacent fat stranding, which may be related to underlying diverticulitis. Labs significant for WBC 13.72. Vitals , /89, Temperature 99.9.       (23 Feb 2025 09:58)      INFECTIOUS DISEASE HISTORY:  History as above.  Previous history of diverticulitis in 8/2024 - treated with levofloxacin/flagyl.   Started to have pain on Saturday which prompted ED visit.   CT Abd/pelvis with 4.5 x 2.9 focus of gas suggestive of contrained perforation.     PAST MEDICAL & SURGICAL HISTORY:  Breast cancer      Diverticulitis      History of hysteroscopy      H/O: hysterectomy          FAMILY HISTORY  No pertinent family history in first degree relatives        SOCIAL HISTORY  Social History:  Denies etoh use, drug use       ROS  General: Denies rigors, nightsweats  HEENT: Denies headache, rhinorrhea, sore throat, eye pain  CV: Denies CP, palpitations  PULM: Denies wheezing, hemoptysis  GI: Denies hematemesis, hematochezia, melena  : Denies discharge, hematuria  MSK: Denies arthralgias, myalgias  SKIN: Denies rash, lesions  NEURO: Denies paresthesias, weakness  PSYCH: Denies depression, anxiety    VITALS:  T(F): 99.4, Max: 99.7 (02-24-25 @ 01:15)  HR: 118  BP: 135/69  RR: 18Vital Signs Last 24 Hrs  T(C): 37.4 (24 Feb 2025 09:54), Max: 37.6 (24 Feb 2025 01:15)  T(F): 99.4 (24 Feb 2025 09:54), Max: 99.7 (24 Feb 2025 01:15)  HR: 118 (24 Feb 2025 09:54) (92 - 118)  BP: 135/69 (24 Feb 2025 09:54) (123/54 - 146/78)  BP(mean): 77 (23 Feb 2025 18:49) (77 - 77)  RR: 18 (24 Feb 2025 09:54) (18 - 18)  SpO2: 97% (24 Feb 2025 09:54) (97% - 99%)        PHYSICAL EXAM:  Gen: NAD, resting in bed  HEENT: Normocephalic, atraumatic  Neck: supple, no lymphadenopathy  CV: Regular rate & regular rhythm  Lungs: decreased BS at bases, no fremitus  Abdomen: Soft, BS present; tender in left lower quadrant   Ext: Warm, well perfused  Neuro: non focal, awake  Skin: no rash, no erythema  Lines: no phlebitis    TESTS & MEASUREMENTS:                        11.3   13.72 )-----------( 372      ( 23 Feb 2025 06:15 )             34.6     02-23    139  |  102  |  9[L]  ----------------------------<  118[H]  4.6   |  26  |  0.8    Ca    9.6      23 Feb 2025 06:15    TPro  7.5  /  Alb  4.2  /  TBili  0.3  /  DBili  <0.2  /  AST  14  /  ALT  7   /  AlkPhos  109  02-23      LIVER FUNCTIONS - ( 23 Feb 2025 06:15 )  Alb: 4.2 g/dL / Pro: 7.5 g/dL / ALK PHOS: 109 U/L / ALT: 7 U/L / AST: 14 U/L / GGT: x           Urinalysis Basic - ( 23 Feb 2025 09:25 )    Color: Yellow / Appearance: Clear / SG: >1.030 / pH: x  Gluc: x / Ketone: Negative mg/dL  / Bili: Negative / Urobili: 1.0 mg/dL   Blood: x / Protein: Negative mg/dL / Nitrite: Negative   Leuk Esterase: Negative / RBC: x / WBC x   Sq Epi: x / Non Sq Epi: x / Bacteria: x        Urinalysis with Rflx Culture (collected 02-23-25 @ 09:25)    Culture - Blood (collected 08-17-24 @ 21:57)  Source: .Blood Blood  Final Report (08-23-24 @ 06:01):    No growth at 5 days    Culture - Blood (collected 08-17-24 @ 21:57)  Source: .Blood Blood  Final Report (08-23-24 @ 06:01):    No growth at 5 days    Urinalysis with Rflx Culture (collected 08-17-24 @ 19:51)    Culture - Urine (collected 08-17-24 @ 19:51)  Source: Catheterized None  Final Report (08-19-24 @ 14:01):    <10,000 CFU/mL Normal Urogenital Inna        Lactate, Blood: 1.5 mmol/L (02-23-25 @ 06:15)      INFECTIOUS DISEASES TESTING      RADIOLOGY & ADDITIONAL TESTS:  I have personally reviewed the last Chest xray  CXR      CT  CT Abdomen and Pelvis w/ IV Cont:   ACC: [id number] EXAM:  CT ABDOMEN AND PELVIS IC   ORDERED BY: BEN HERNANDEZ     PROCEDURE DATE:  02/23/2025          INTERPRETATION:  CLINICAL INFORMATION: Left lower quadrant pain with   history of diverticulitis.    COMPARISON: 8/17/2024    CONTRAST/COMPLICATIONS:  IV Contrast: Omnipaque 350  100 cc administered   0 cc discarded  Oral Contrast: NONE  .    PROCEDURE:  CT of the Abdomen and Pelvis was performed.  Sagittal and coronal reformats were performed.    FINDINGS:  LOWER CHEST: Within normal limits.    LIVER: Within normal limits.  BILE DUCTS: Normal caliber.  GALLBLADDER: Within normal limits.  SPLEEN: Within normal limits.  PANCREAS: Within normal limits.  ADRENALS: Within normal limits.  KIDNEYS/URETERS: Within normal limits.    BLADDER: Within normal limits.  REPRODUCTIVE ORGANS: Hysterectomy.    BOWEL: 4.5 x 2.9 cm focus of gas with a small air-fluid level seen   adjacent to the sigmoid colon suggestive of a contained perforation. Mild   adjacent fat stranding, which may be related to underlying   diverticulitis. No bowel obstruction. Appendix is normal.  PERITONEUM/RETROPERITONEUM: Within normal limits.  VESSELS: Atherosclerotic changes.  LYMPH NODES: No lymphadenopathy.  ABDOMINAL WALL: Within normal limits.  BONES: Degenerative changes.    IMPRESSION:  4.5 x 2.9 cm focus of gas with a small air-fluid level seen adjacent to   the sigmoid colon suggestive of a contained perforation. Mild adjacent   fat stranding, which may be related to underlying diverticulitis.    Consider interval follow-up with p.o. or rectal contrast.    --- End of Report ---            NIRMAL BENDER MD; Attending Radiologist  This document has been electronically signed. Feb 23 2025  9:25AM (02-23-25 @ 08:53)      CARDIOLOGY TESTING      MEDICATIONS  enoxaparin Injectable 40 SubCutaneous every 24 hours  lactated ringers. 1000 IV Continuous <Continuous>  multivitamin 1 Oral daily  piperacillin/tazobactam IVPB.. 3.375 IV Intermittent every 8 hours      Weight  Weight (kg): 75.3 (02-23-25 @ 04:22)    ANTIBIOTICS:  piperacillin/tazobactam IVPB.. 3.375 Gram(s) IV Intermittent every 8 hours      ALLERGIES:  adhesives (Other)  No Known Drug Allergies

## 2025-02-24 NOTE — CONSULT NOTE ADULT - ASSESSMENT
ASSESSMENT  Pt is a 70y female PMHx of breast cancer, diverticulitis. PSHx of hysterectomy. Pt presents with LLQ pain that occurred a few hours after eating last night follow by a few episodes of nbnb diarrhea     IMPRESSION  #Diverticulitis with contained perforation   - CT Abdomen and Pelvis w/ IV Cont (02.23.25 @ 08:53): 4.5 x 2.9 cm focus of gas with a small air-fluid level seen adjacent to  the sigmoid colon suggestive of a contained perforation. Mild adjacent  fat stranding, which may be related to underlying diverticulitis. Consider interval follow-up with p.o. or rectal contrast.    #Breast Cancer    #Abx allergy: No Known Drug Allergies    RECOMMENDATIONS  - continue zosyn 3.375 mg q 8 horrs  - planned for OR 2/25 for ex-lap, possible bowel resection/possible ostomy   - if possible, please send intra-op bacterial cx   - trend WBC     Please call or message on [employer] Teams if with any questions.  Spectra [phone]

## 2025-02-24 NOTE — CONSULT NOTE ADULT - CONSULT REASON
contained perforated sigmoid diverticulitis
Complicated Sigmoid Diverticulitis with extraluminal air pocket, consult for drainage

## 2025-02-25 ENCOUNTER — RESULT REVIEW (OUTPATIENT)
Age: 70
End: 2025-02-25

## 2025-02-25 PROCEDURE — 88307 TISSUE EXAM BY PATHOLOGIST: CPT | Mod: 26

## 2025-02-25 PROCEDURE — 93010 ELECTROCARDIOGRAM REPORT: CPT | Mod: 77

## 2025-02-25 PROCEDURE — 71045 X-RAY EXAM CHEST 1 VIEW: CPT | Mod: 26

## 2025-02-25 PROCEDURE — 93010 ELECTROCARDIOGRAM REPORT: CPT

## 2025-02-25 PROCEDURE — 44143 PARTIAL REMOVAL OF COLON: CPT

## 2025-02-25 RX ORDER — ACETAMINOPHEN 500 MG/5ML
650 LIQUID (ML) ORAL EVERY 6 HOURS
Refills: 0 | Status: DISCONTINUED | OUTPATIENT
Start: 2025-02-25 | End: 2025-02-26

## 2025-02-25 RX ORDER — HYDROMORPHONE/SOD CHLOR,ISO/PF 2 MG/10 ML
30 SYRINGE (ML) INJECTION
Refills: 0 | Status: DISCONTINUED | OUTPATIENT
Start: 2025-02-25 | End: 2025-02-27

## 2025-02-25 RX ORDER — KETOROLAC TROMETHAMINE 30 MG/ML
15 INJECTION, SOLUTION INTRAMUSCULAR; INTRAVENOUS EVERY 6 HOURS
Refills: 0 | Status: DISCONTINUED | OUTPATIENT
Start: 2025-02-25 | End: 2025-02-26

## 2025-02-25 RX ORDER — BENZOCAINE 220 MG/G
1 SPRAY, METERED PERIODONTAL ONCE
Refills: 0 | Status: COMPLETED | OUTPATIENT
Start: 2025-02-25 | End: 2025-02-25

## 2025-02-25 RX ORDER — POTASSIUM PHOSPHATE, MONOBASIC POTASSIUM PHOSPHATE, DIBASIC INJECTION, 236; 224 MG/ML; MG/ML
15 SOLUTION, CONCENTRATE INTRAVENOUS ONCE
Refills: 0 | Status: COMPLETED | OUTPATIENT
Start: 2025-02-25 | End: 2025-02-25

## 2025-02-25 RX ORDER — NALOXONE HYDROCHLORIDE 0.4 MG/ML
0.1 INJECTION, SOLUTION INTRAMUSCULAR; INTRAVENOUS; SUBCUTANEOUS
Refills: 0 | Status: DISCONTINUED | OUTPATIENT
Start: 2025-02-25 | End: 2025-02-27

## 2025-02-25 RX ORDER — SODIUM CHLORIDE 9 G/1000ML
1000 INJECTION, SOLUTION INTRAVENOUS
Refills: 0 | Status: DISCONTINUED | OUTPATIENT
Start: 2025-02-25 | End: 2025-02-27

## 2025-02-25 RX ORDER — ONDANSETRON HCL/PF 4 MG/2 ML
4 VIAL (ML) INJECTION EVERY 6 HOURS
Refills: 0 | Status: DISCONTINUED | OUTPATIENT
Start: 2025-02-25 | End: 2025-03-04

## 2025-02-25 RX ORDER — SODIUM CHLORIDE 9 G/1000ML
1000 INJECTION, SOLUTION INTRAVENOUS
Refills: 0 | Status: DISCONTINUED | OUTPATIENT
Start: 2025-02-25 | End: 2025-02-25

## 2025-02-25 RX ORDER — ENOXAPARIN SODIUM 100 MG/ML
40 INJECTION SUBCUTANEOUS EVERY 24 HOURS
Refills: 0 | Status: DISCONTINUED | OUTPATIENT
Start: 2025-02-25 | End: 2025-03-04

## 2025-02-25 RX ORDER — PIPERACILLIN-TAZO-DEXTROSE,ISO 3.375G/5
3.38 IV SOLUTION, PIGGYBACK PREMIX FROZEN(ML) INTRAVENOUS EVERY 8 HOURS
Refills: 0 | Status: DISCONTINUED | OUTPATIENT
Start: 2025-02-25 | End: 2025-02-25

## 2025-02-25 RX ORDER — ONDANSETRON HCL/PF 4 MG/2 ML
4 VIAL (ML) INJECTION ONCE
Refills: 0 | Status: DISCONTINUED | OUTPATIENT
Start: 2025-02-25 | End: 2025-02-25

## 2025-02-25 RX ORDER — HYDROMORPHONE/SOD CHLOR,ISO/PF 2 MG/10 ML
1 SYRINGE (ML) INJECTION
Refills: 0 | Status: DISCONTINUED | OUTPATIENT
Start: 2025-02-25 | End: 2025-02-25

## 2025-02-25 RX ORDER — HYDROMORPHONE/SOD CHLOR,ISO/PF 2 MG/10 ML
0.5 SYRINGE (ML) INJECTION
Refills: 0 | Status: DISCONTINUED | OUTPATIENT
Start: 2025-02-25 | End: 2025-02-25

## 2025-02-25 RX ORDER — PIPERACILLIN-TAZO-DEXTROSE,ISO 3.375G/5
3.38 IV SOLUTION, PIGGYBACK PREMIX FROZEN(ML) INTRAVENOUS EVERY 8 HOURS
Refills: 0 | Status: DISCONTINUED | OUTPATIENT
Start: 2025-02-25 | End: 2025-03-03

## 2025-02-25 RX ADMIN — SODIUM CHLORIDE 100 MILLILITER(S): 9 INJECTION, SOLUTION INTRAVENOUS at 18:52

## 2025-02-25 RX ADMIN — POTASSIUM PHOSPHATE, MONOBASIC POTASSIUM PHOSPHATE, DIBASIC INJECTION, 63.75 MILLIMOLE(S): 236; 224 SOLUTION, CONCENTRATE INTRAVENOUS at 05:35

## 2025-02-25 RX ADMIN — BENZOCAINE 1 SPRAY(S): 220 SPRAY, METERED PERIODONTAL at 21:56

## 2025-02-25 RX ADMIN — SODIUM CHLORIDE 115 MILLILITER(S): 9 INJECTION, SOLUTION INTRAVENOUS at 05:34

## 2025-02-25 RX ADMIN — SODIUM CHLORIDE 500 MILLILITER(S): 9 INJECTION, SOLUTION INTRAVENOUS at 01:06

## 2025-02-25 RX ADMIN — Medication 25 GRAM(S): at 05:35

## 2025-02-25 RX ADMIN — Medication 30 MILLILITER(S): at 18:51

## 2025-02-25 RX ADMIN — Medication 20 MILLIEQUIVALENT(S): at 01:28

## 2025-02-25 RX ADMIN — SODIUM CHLORIDE 115 MILLILITER(S): 9 INJECTION, SOLUTION INTRAVENOUS at 18:55

## 2025-02-25 RX ADMIN — Medication 20 MILLIEQUIVALENT(S): at 05:35

## 2025-02-25 RX ADMIN — Medication 25 GRAM(S): at 19:25

## 2025-02-25 NOTE — PROGRESS NOTE ADULT - SUBJECTIVE AND OBJECTIVE BOX
GENERAL SURGERY PROGRESS NOTE    Patient: JOSE DE JESUS MYLES , 70y ([birth year])Female   MRN: [id number]  Location: [place] [address] A  Visit: 02-23-25 Inpatient  Date: 02-25-25 @ 07:20    Hospital Day #: 3    Procedure/Dx/Injuries:   SIGMOID DIVERTICULITIS  WITH CONTAINED PERFORATION  (4CM X 3CM EXTRALUMINAL AIR)    Events of past 24 hours: Patient is preop'd for the OR tomorrow. She is kept NPO with IVF. Plan to take her for EX LAP, POSS BOWEL RESECTION, POSS OSTOMY.     PHYSICAL EXAM:  GENERAL: A&Ox3, NAD  HEENT: Normocephalic, atraumatic  PULM: Non-labored respirations, bilateral chest rise, saturating well on RA  CV: Regular rhythm  ABDOMEN: Abdomen soft, non-distended, LLQ tenderness, no rebound tenderness or guarding  MSK: Moving extremities spontaneously, BUE and BLE sensorimotor and strength intact  NEURO: No focal deficits  SKIN: Warm, dry, normal skin color, texture, turgor      PAST MEDICAL & SURGICAL HISTORY:  Breast cancer      Diverticulitis      History of hysteroscopy      H/O: hysterectomy          Vitals:   T(F): 98.5 (02-25-25 @ 00:06), Max: 99.4 (02-24-25 @ 09:54)  HR: 113 (02-25-25 @ 00:06)  BP: 132/70 (02-25-25 @ 00:06)  RR: 18 (02-25-25 @ 00:06)  SpO2: 95% (02-25-25 @ 00:06)      Diet, NPO:   Except Medications      Fluids:     I & O's:    MEDICATIONS  (STANDING):  enoxaparin Injectable 40 milliGRAM(s) SubCutaneous every 24 hours  lactated ringers. 1000 milliLiter(s) (115 mL/Hr) IV Continuous <Continuous>  multivitamin 1 Tablet(s) Oral daily  pantoprazole  Injectable 40 milliGRAM(s) IV Push daily  piperacillin/tazobactam IVPB.. 3.375 Gram(s) IV Intermittent every 8 hours    MEDICATIONS  (PRN):  acetaminophen     Tablet .. 650 milliGRAM(s) Oral every 6 hours PRN Temp greater or equal to 38C (100.4F), Mild Pain (1 - 3)  ketorolac   Injectable 15 milliGRAM(s) IV Push every 6 hours PRN Severe Pain (7 - 10)  ondansetron Injectable 4 milliGRAM(s) IV Push every 6 hours PRN Nausea      DVT PROPHYLAXIS: enoxaparin Injectable 40 milliGRAM(s) SubCutaneous every 24 hours    GI PROPHYLAXIS: pantoprazole  Injectable 40 milliGRAM(s) IV Push daily    ANTICOAGULATION:   ANTIBIOTICS:  piperacillin/tazobactam IVPB.. 3.375 Gram(s)            LAB/STUDIES:  Labs:  CAPILLARY BLOOD GLUCOSE                              10.2   22.37 )-----------( 317      ( 24 Feb 2025 22:23 )             30.5       Auto Immature Granulocyte %: 0.5 % (02-24-25 @ 22:23)  Auto Immature Granulocyte %: 0.7 % (02-24-25 @ 11:20)    02-24    138  |  101  |  8[L]  ----------------------------<  78  3.5   |  23  |  0.8      Calcium: 8.8 mg/dL (02-24-25 @ 22:23)      LFTs:     Lactate, Blood: 1.0 mmol/L (02-24-25 @ 11:20)  Lactate, Blood: 1.5 mmol/L (02-23-25 @ 06:15)      Coags:     19.70  ----< 1.65    ( 24 Feb 2025 22:23 )     33.4                Urinalysis Basic - ( 24 Feb 2025 22:23 )    Color: x / Appearance: x / SG: x / pH: x  Gluc: 78 mg/dL / Ketone: x  / Bili: x / Urobili: x   Blood: x / Protein: x / Nitrite: x   Leuk Esterase: x / RBC: x / WBC x   Sq Epi: x / Non Sq Epi: x / Bacteria: x        Urinalysis with Rflx Culture (collected 23 Feb 2025 09:25)          ASSESSMENT:  69 y/o female with Sigmoid Diverticulitis with Perforation and Abscess formation.  Localized peritonitis.  Abdominal pain.    PLAN:  - NPO, IVF  - follow serum electrolytes and UOP  - on Zosyn  - DVT proph    [phone]

## 2025-02-25 NOTE — CHART NOTE - NSCHARTNOTEFT_GEN_A_CORE
PACU ANESTHESIA ADMISSION NOTE      Procedure: exploratory laparotomy, Caroline's procedure  Post op diagnosis:  diverticular abscess    __x__  Patent Airway    __x__  Full return of protective reflexes    __x__  Full recovery from anesthesia / back to baseline status    Vitals:  T(C): 36.7 (02-25-25 @ 11:04), Max: 36.9 (02-25-25 @ 00:06)  HR: 108 (02-25-25 @ 11:04) (108 - 113)  BP: 149/68 (02-25-25 @ 11:04) (131/76 - 149/68)  RR: 18 (02-25-25 @ 11:04) (18 - 18)  SpO2: 99% (02-25-25 @ 11:04) (95% - 99%)    Mental Status:  __x__ Awake   ___x__ Alert   _____ Drowsy   _____ Sedated    Nausea/Vomiting:  __x__ NO  ______Yes,   See Post - Op Orders          Pain Scale (0-10):  __0___    Treatment: ____ None    __x__ See Post - Op/PCA Orders    Post - Operative Fluids:   ____ Oral   __x__ See Post - Op Orders    Plan: Discharge:   ____Home       __x___Floor     _____Critical Care    _____  Other:_________________    Comments: Patient had smooth intraoperative event, no anesthesia complication.  PACU Vital signs: HR: 106            BP:        151/72          RR:16             O2 Sat:       96%     Temp 98
POST-OP CHECK    PROCEDURE: S/P exploratory laparotomy with raissa's procedure    S: Patient awake and alert. Diffuse abdominal pain, improved with pain medication, PCA at bedside. Incisions C/D/I. NORY drain in place. Colostomy with small amount of dark red blood. Patient denies N/V, dyspnea, CP, or palpitations. A-line removed, Engel in place    O:   T(C): 37.2 (02-25-25 @ 18:50), Max: 37.2 (02-25-25 @ 18:50)  HR: 110 (02-25-25 @ 20:00) (102 - 110)  BP: 177/71 (02-25-25 @ 20:00) (177/71 - 194/77)  RR: 18 (02-25-25 @ 20:00) (17 - 20)  SpO2: 94% (02-25-25 @ 20:00) (94% - 99%)  I&O's Summary    25 Feb 2025 07:01  -  25 Feb 2025 22:24  --------------------------------------------------------  IN: 0 mL / OUT: 235 mL / NET: -235 mL      Diet, NPO:   Except Medications      PHYSICAL EXAM:  GENERAL: NAD, awake and alert  CHEST/LUNG: Equal chest rise bilaterally, normal work of breathing on 2L NC  HEART: NSR, tachycardic  ABDOMEN: Soft, non-tender, non-distended. Incisions C/D/I, WV in place and functioning. NORY drain in place with SS fluid  EXTREMITIES:  2+ peripheral pulses b/l. No peripheral edema  NEUROLOGY: A&O x 3, no focal deficits    MEDICATIONS  (STANDING):  enoxaparin Injectable 40 milliGRAM(s) SubCutaneous every 24 hours  HYDROmorphone PCA (1 mG/mL) 30 milliLiter(s) PCA Continuous PCA Continuous  lactated ringers. 1000 milliLiter(s) (100 mL/Hr) IV Continuous <Continuous>  lactated ringers. 1000 milliLiter(s) (115 mL/Hr) IV Continuous <Continuous>  pantoprazole  Injectable 40 milliGRAM(s) IV Push daily  piperacillin/tazobactam IVPB.. 3.375 Gram(s) IV Intermittent every 8 hours    MEDICATIONS  (PRN):  acetaminophen     Tablet .. 650 milliGRAM(s) Oral every 6 hours PRN Temp greater or equal to 38C (100.4F), Mild Pain (1 - 3)  HYDROmorphone  Injectable 0.5 milliGRAM(s) IV Push every 10 minutes PRN Moderate Pain (4 - 6)  HYDROmorphone  Injectable 1 milliGRAM(s) IV Push every 10 minutes PRN Severe Pain (7 - 10)  ketorolac   Injectable 15 milliGRAM(s) IV Push every 6 hours PRN Severe Pain (7 - 10)  naloxone Injectable 0.1 milliGRAM(s) IV Push every 3 minutes PRN For ANY of the following changes in patient status:  A. RR LESS THAN 10 breaths per minute, B. Oxygen saturation LESS THAN 90%, C. Sedation score of 6  ondansetron Injectable 4 milliGRAM(s) IV Push every 6 hours PRN Nausea  ondansetron Injectable 4 milliGRAM(s) IV Push once PRN Nausea and/or Vomiting      LABS:                        10.2   22.37 )-----------( 317      ( 24 Feb 2025 22:23 )             30.5     02-24    138  |  101  |  8[L]  ----------------------------<  78  3.5   |  23  |  0.8    Ca    8.8      24 Feb 2025 22:23  Phos  2.8     02-24  Mg     2.0     02-24        PT/INR - ( 24 Feb 2025 22:23 )   PT: 19.70 sec;   INR: 1.65 ratio         PTT - ( 24 Feb 2025 22:23 )  PTT:33.4 sec    ASSESSMENT/PLAN:   70y y/o  Female  s/p exploratory laparotomy with raissa's procedure    PLAN:  - NGT to LCWS  - NPO, IVF  - Zosyn  - Engel until tomorrow AM (2/26)  - Pain control  - Encourage OOB, IS    SPECTRA:

## 2025-02-25 NOTE — PHARMACOTHERAPY INTERVENTION NOTE - COMMENTS
Recommended initiating piperacillin-tazobactam 3.375g IV q8h, dosed basd on an eGFR of 78mL/min/1.73m2, in the setting of the order being discontinued upon transfer.

## 2025-02-25 NOTE — BRIEF OPERATIVE NOTE - OPERATION/FINDINGS
Exploratory laparotomy, findings of acute inflamed sigmoid colon, sigmoidectomy, creation of end colostomy, placement of 10Fr NORY drain near rectal stump.

## 2025-02-25 NOTE — PROGRESS NOTE ADULT - SUBJECTIVE AND OBJECTIVE BOX
JOSE DE JESUS MYLES  70y, Female  Allergy: adhesives (Other)  No Known Drug Allergies      LOS  2d    CHIEF COMPLAINT: contained perforated sigmoid diverticulitis (25 Feb 2025 07:20)      INTERVAL EVENTS/HPI  - No acute events overnight  - T(F): , Max: 98.6 (02-24-25 @ 16:40)  - WBC Count: 22.37 (02-24-25 @ 22:23)  WBC Count: 24.78 (02-24-25 @ 11:20)     - Creatinine: 0.8 (02-24-25 @ 22:23)  Creatinine: 0.8 (02-24-25 @ 11:20)       ROS  General: Denies rigors, nightsweats  HEENT: Denies headache, rhinorrhea, sore throat, eye pain  CV: Denies CP, palpitations  PULM: Denies wheezing, hemoptysis  GI: Denies hematemesis, hematochezia, melena  : Denies discharge, hematuria  MSK: Denies arthralgias, myalgias  SKIN: Denies rash, lesions  NEURO: Denies paresthesias, weakness  PSYCH: Denies depression, anxiety    VITALS:  T(F): 98, Max: 98.6 (02-24-25 @ 16:40)  HR: 108  BP: 149/68  RR: 18Vital Signs Last 24 Hrs  T(C): 36.7 (25 Feb 2025 11:04), Max: 37 (24 Feb 2025 16:40)  T(F): 98 (25 Feb 2025 10:45), Max: 98.6 (24 Feb 2025 16:40)  HR: 108 (25 Feb 2025 11:04) (89 - 113)  BP: 149/68 (25 Feb 2025 11:04) (131/76 - 149/68)  BP(mean): 77 (25 Feb 2025 11:04) (77 - 77)  RR: 18 (25 Feb 2025 11:04) (18 - 18)  SpO2: 99% (25 Feb 2025 11:04) (95% - 99%)    Parameters below as of 25 Feb 2025 10:45  Patient On (Oxygen Delivery Method): room air        PHYSICAL EXAM:  Gen: NAD, resting in bed  HEENT: Normocephalic, atraumatic  Neck: supple, no lymphadenopathy  CV: Regular rate & regular rhythm  Lungs: decreased BS at bases, no fremitus  Abdomen: Soft, BS present  Ext: Warm, well perfused  Neuro: non focal, awake  Skin: no rash, no erythema  Lines: no phlebitis    FH: Non-contributory  Social Hx: Non-contributory    TESTS & MEASUREMENTS:                        10.2   22.37 )-----------( 317      ( 24 Feb 2025 22:23 )             30.5     02-24    138  |  101  |  8[L]  ----------------------------<  78  3.5   |  23  |  0.8    Ca    8.8      24 Feb 2025 22:23  Phos  2.8     02-24  Mg     2.0     02-24          Urinalysis Basic - ( 24 Feb 2025 22:23 )    Color: x / Appearance: x / SG: x / pH: x  Gluc: 78 mg/dL / Ketone: x  / Bili: x / Urobili: x   Blood: x / Protein: x / Nitrite: x   Leuk Esterase: x / RBC: x / WBC x   Sq Epi: x / Non Sq Epi: x / Bacteria: x        Urinalysis with Rflx Culture (collected 02-23-25 @ 09:25)        Lactate, Blood: 1.0 mmol/L (02-24-25 @ 11:20)  Lactate, Blood: 1.5 mmol/L (02-23-25 @ 06:15)      INFECTIOUS DISEASES TESTING      INFLAMMATORY MARKERS      RADIOLOGY & ADDITIONAL TESTS:  I have personally reviewed the last available Chest xray  CXR  Xray Chest 1 View- PORTABLE-Urgent:   ACC: [id number] EXAM:  XR CHEST PORTABLE URGENT 1V   ORDERED BY: BO MCDANIELS     PROCEDURE DATE:  02/24/2025          INTERPRETATION:  CLINICAL HISTORY: Preop    COMPARISON: None.    TECHNIQUE: None    FINDINGS:    Support devices: None.    Cardiac/mediastinum/hilum: Aortic calcifications.    Lung parenchyma/Pleura: No focal parenchymal opacities, pleural   effusions, or pneumothorax.    Skeleton/soft tissues: Right axillary surgical clips. Degenerative   changes of the spine noted.      IMPRESSION:    No radiographic evidence of acute cardiopulmonary disease.    --- End of Report ---            JIMENEZ SABA MD; Attending Radiologist  This document has been electronically signed. Feb 24 2025  2:10PM (02-24-25 @ 12:39)      CT  CT Abdomen and Pelvis w/ IV Cont:   ACC: [id number] EXAM:  CT ABDOMEN AND PELVIS IC   ORDERED BY: BEN HERNANDEZ     PROCEDURE DATE:  02/23/2025          INTERPRETATION:  CLINICAL INFORMATION: Left lower quadrant pain with   history of diverticulitis.    COMPARISON: 8/17/2024    CONTRAST/COMPLICATIONS:  IV Contrast: Omnipaque 350  100 cc administered   0 cc discarded  Oral Contrast: NONE  .    PROCEDURE:  CT of the Abdomen and Pelvis was performed.  Sagittal and coronal reformats were performed.    FINDINGS:  LOWER CHEST: Within normal limits.    LIVER: Within normal limits.  BILE DUCTS: Normal caliber.  GALLBLADDER: Within normal limits.  SPLEEN: Within normal limits.  PANCREAS: Within normal limits.  ADRENALS: Within normal limits.  KIDNEYS/URETERS: Within normal limits.    BLADDER: Within normal limits.  REPRODUCTIVE ORGANS: Hysterectomy.    BOWEL: 4.5 x 2.9 cm focus of gas with a small air-fluid level seen   adjacent to the sigmoid colon suggestive of a contained perforation. Mild   adjacent fat stranding, which may be related to underlying   diverticulitis. No bowel obstruction. Appendix is normal.  PERITONEUM/RETROPERITONEUM: Within normal limits.  VESSELS: Atherosclerotic changes.  LYMPH NODES: No lymphadenopathy.  ABDOMINAL WALL: Within normal limits.  BONES: Degenerative changes.    IMPRESSION:  4.5 x 2.9 cm focus of gas with a small air-fluid level seen adjacent to   the sigmoid colon suggestive of a contained perforation. Mild adjacent   fat stranding, which may be related to underlying diverticulitis.    Consider interval follow-up with p.o. or rectal contrast.    --- End of Report ---            NIRMAL BENDER MD; Attending Radiologist  This document has been electronically signed. Feb 23 2025  9:25AM (02-23-25 @ 08:53)      CARDIOLOGY TESTING      MEDICATIONS      WEIGHT  Weight (kg): 75.3 (02-25-25 @ 11:04)  Creatinine: 0.8 mg/dL (02-24-25 @ 22:23)      ANTIBIOTICS:      All available historical records have been reviewed

## 2025-02-26 LAB
ANION GAP SERPL CALC-SCNC: 11 MMOL/L — SIGNIFICANT CHANGE UP (ref 7–14)
ANION GAP SERPL CALC-SCNC: 11 MMOL/L — SIGNIFICANT CHANGE UP (ref 7–14)
BASOPHILS # BLD AUTO: 0.02 K/UL — SIGNIFICANT CHANGE UP (ref 0–0.2)
BASOPHILS NFR BLD AUTO: 0.1 % — SIGNIFICANT CHANGE UP (ref 0–1)
BUN SERPL-MCNC: 10 MG/DL — SIGNIFICANT CHANGE UP (ref 10–20)
BUN SERPL-MCNC: 8 MG/DL — LOW (ref 10–20)
CALCIUM SERPL-MCNC: 8.1 MG/DL — LOW (ref 8.4–10.5)
CALCIUM SERPL-MCNC: 8.3 MG/DL — LOW (ref 8.4–10.4)
CHLORIDE SERPL-SCNC: 102 MMOL/L — SIGNIFICANT CHANGE UP (ref 98–110)
CHLORIDE SERPL-SCNC: 103 MMOL/L — SIGNIFICANT CHANGE UP (ref 98–110)
CO2 SERPL-SCNC: 25 MMOL/L — SIGNIFICANT CHANGE UP (ref 17–32)
CO2 SERPL-SCNC: 26 MMOL/L — SIGNIFICANT CHANGE UP (ref 17–32)
CREAT SERPL-MCNC: 0.7 MG/DL — SIGNIFICANT CHANGE UP (ref 0.7–1.5)
CREAT SERPL-MCNC: 0.7 MG/DL — SIGNIFICANT CHANGE UP (ref 0.7–1.5)
EGFR: 92 ML/MIN/1.73M2 — SIGNIFICANT CHANGE UP
EOSINOPHIL # BLD AUTO: 0.04 K/UL — SIGNIFICANT CHANGE UP (ref 0–0.7)
EOSINOPHIL NFR BLD AUTO: 0.2 % — SIGNIFICANT CHANGE UP (ref 0–8)
GLUCOSE SERPL-MCNC: 116 MG/DL — HIGH (ref 70–99)
GLUCOSE SERPL-MCNC: 156 MG/DL — HIGH (ref 70–99)
HCT VFR BLD CALC: 27.8 % — LOW (ref 37–47)
HCT VFR BLD CALC: 32.3 % — LOW (ref 37–47)
HGB BLD-MCNC: 10.9 G/DL — LOW (ref 12–16)
HGB BLD-MCNC: 9.1 G/DL — LOW (ref 12–16)
IMM GRANULOCYTES NFR BLD AUTO: 0.6 % — HIGH (ref 0.1–0.3)
LYMPHOCYTES # BLD AUTO: 11.7 % — LOW (ref 20.5–51.1)
LYMPHOCYTES # BLD AUTO: 2.17 K/UL — SIGNIFICANT CHANGE UP (ref 1.2–3.4)
MAGNESIUM SERPL-MCNC: 1.4 MG/DL — LOW (ref 1.8–2.4)
MAGNESIUM SERPL-MCNC: 1.8 MG/DL — SIGNIFICANT CHANGE UP (ref 1.8–2.4)
MCHC RBC-ENTMCNC: 29.6 PG — SIGNIFICANT CHANGE UP (ref 27–31)
MCHC RBC-ENTMCNC: 30.2 PG — SIGNIFICANT CHANGE UP (ref 27–31)
MCHC RBC-ENTMCNC: 32.7 G/DL — SIGNIFICANT CHANGE UP (ref 32–37)
MCHC RBC-ENTMCNC: 33.7 G/DL — SIGNIFICANT CHANGE UP (ref 32–37)
MCV RBC AUTO: 89.5 FL — SIGNIFICANT CHANGE UP (ref 81–99)
MCV RBC AUTO: 90.6 FL — SIGNIFICANT CHANGE UP (ref 81–99)
MONOCYTES # BLD AUTO: 1.67 K/UL — HIGH (ref 0.1–0.6)
MONOCYTES NFR BLD AUTO: 9 % — SIGNIFICANT CHANGE UP (ref 1.7–9.3)
NEUTROPHILS # BLD AUTO: 14.55 K/UL — HIGH (ref 1.4–6.5)
NEUTROPHILS NFR BLD AUTO: 78.4 % — HIGH (ref 42.2–75.2)
NRBC BLD AUTO-RTO: 0 /100 WBCS — SIGNIFICANT CHANGE UP (ref 0–0)
NRBC BLD AUTO-RTO: 0 /100 WBCS — SIGNIFICANT CHANGE UP (ref 0–0)
PHOSPHATE SERPL-MCNC: 2 MG/DL — LOW (ref 2.1–4.9)
PHOSPHATE SERPL-MCNC: 3.3 MG/DL — SIGNIFICANT CHANGE UP (ref 2.1–4.9)
PLATELET # BLD AUTO: 334 K/UL — SIGNIFICANT CHANGE UP (ref 130–400)
PLATELET # BLD AUTO: 341 K/UL — SIGNIFICANT CHANGE UP (ref 130–400)
PMV BLD: 10.4 FL — SIGNIFICANT CHANGE UP (ref 7.4–10.4)
PMV BLD: 10.5 FL — HIGH (ref 7.4–10.4)
POTASSIUM SERPL-MCNC: 4.3 MMOL/L — SIGNIFICANT CHANGE UP (ref 3.5–5)
POTASSIUM SERPL-MCNC: 4.8 MMOL/L — SIGNIFICANT CHANGE UP (ref 3.5–5)
POTASSIUM SERPL-SCNC: 4.3 MMOL/L — SIGNIFICANT CHANGE UP (ref 3.5–5)
POTASSIUM SERPL-SCNC: 4.8 MMOL/L — SIGNIFICANT CHANGE UP (ref 3.5–5)
RBC # BLD: 3.07 M/UL — LOW (ref 4.2–5.4)
RBC # BLD: 3.61 M/UL — LOW (ref 4.2–5.4)
RBC # FLD: 12.7 % — SIGNIFICANT CHANGE UP (ref 11.5–14.5)
RBC # FLD: 13 % — SIGNIFICANT CHANGE UP (ref 11.5–14.5)
SODIUM SERPL-SCNC: 138 MMOL/L — SIGNIFICANT CHANGE UP (ref 135–146)
SODIUM SERPL-SCNC: 140 MMOL/L — SIGNIFICANT CHANGE UP (ref 135–146)
WBC # BLD: 18.56 K/UL — HIGH (ref 4.8–10.8)
WBC # BLD: 23.55 K/UL — HIGH (ref 4.8–10.8)
WBC # FLD AUTO: 18.56 K/UL — HIGH (ref 4.8–10.8)
WBC # FLD AUTO: 23.55 K/UL — HIGH (ref 4.8–10.8)

## 2025-02-26 PROCEDURE — 99024 POSTOP FOLLOW-UP VISIT: CPT

## 2025-02-26 RX ORDER — KETOROLAC TROMETHAMINE 30 MG/ML
15 INJECTION, SOLUTION INTRAMUSCULAR; INTRAVENOUS EVERY 6 HOURS
Refills: 0 | Status: DISCONTINUED | OUTPATIENT
Start: 2025-02-26 | End: 2025-02-26

## 2025-02-26 RX ORDER — SODIUM PHOSPHATE,DIBASIC DIHYD
30 POWDER (GRAM) MISCELLANEOUS ONCE
Refills: 0 | Status: COMPLETED | OUTPATIENT
Start: 2025-02-26 | End: 2025-02-26

## 2025-02-26 RX ORDER — MAGNESIUM SULFATE 500 MG/ML
2 SYRINGE (ML) INJECTION ONCE
Refills: 0 | Status: COMPLETED | OUTPATIENT
Start: 2025-02-26 | End: 2025-02-26

## 2025-02-26 RX ORDER — BENZOCAINE 220 MG/G
1 SPRAY, METERED PERIODONTAL ONCE
Refills: 0 | Status: COMPLETED | OUTPATIENT
Start: 2025-02-26 | End: 2025-02-26

## 2025-02-26 RX ORDER — KETOROLAC TROMETHAMINE 30 MG/ML
15 INJECTION, SOLUTION INTRAMUSCULAR; INTRAVENOUS EVERY 8 HOURS
Refills: 0 | Status: DISCONTINUED | OUTPATIENT
Start: 2025-02-26 | End: 2025-02-27

## 2025-02-26 RX ORDER — ACETAMINOPHEN 500 MG/5ML
1000 LIQUID (ML) ORAL ONCE
Refills: 0 | Status: DISCONTINUED | OUTPATIENT
Start: 2025-02-26 | End: 2025-02-27

## 2025-02-26 RX ORDER — HYDROMORPHONE/SOD CHLOR,ISO/PF 2 MG/10 ML
0.5 SYRINGE (ML) INJECTION EVERY 4 HOURS
Refills: 0 | Status: DISCONTINUED | OUTPATIENT
Start: 2025-02-26 | End: 2025-02-26

## 2025-02-26 RX ADMIN — KETOROLAC TROMETHAMINE 15 MILLIGRAM(S): 30 INJECTION, SOLUTION INTRAMUSCULAR; INTRAVENOUS at 13:45

## 2025-02-26 RX ADMIN — KETOROLAC TROMETHAMINE 15 MILLIGRAM(S): 30 INJECTION, SOLUTION INTRAMUSCULAR; INTRAVENOUS at 13:17

## 2025-02-26 RX ADMIN — Medication 40 MILLIGRAM(S): at 11:26

## 2025-02-26 RX ADMIN — Medication 12.5 GRAM(S): at 23:31

## 2025-02-26 RX ADMIN — Medication 12.5 GRAM(S): at 08:08

## 2025-02-26 RX ADMIN — SODIUM CHLORIDE 115 MILLILITER(S): 9 INJECTION, SOLUTION INTRAVENOUS at 03:00

## 2025-02-26 RX ADMIN — BENZOCAINE 1 SPRAY(S): 220 SPRAY, METERED PERIODONTAL at 04:38

## 2025-02-26 RX ADMIN — Medication 25 GRAM(S): at 18:01

## 2025-02-26 RX ADMIN — ENOXAPARIN SODIUM 40 MILLIGRAM(S): 100 INJECTION SUBCUTANEOUS at 18:03

## 2025-02-26 RX ADMIN — Medication 25 GRAM(S): at 11:29

## 2025-02-26 RX ADMIN — Medication 25 GRAM(S): at 03:56

## 2025-02-26 RX ADMIN — KETOROLAC TROMETHAMINE 15 MILLIGRAM(S): 30 INJECTION, SOLUTION INTRAMUSCULAR; INTRAVENOUS at 21:34

## 2025-02-26 RX ADMIN — KETOROLAC TROMETHAMINE 15 MILLIGRAM(S): 30 INJECTION, SOLUTION INTRAMUSCULAR; INTRAVENOUS at 22:15

## 2025-02-26 NOTE — PHYSICAL THERAPY INITIAL EVALUATION ADULT - ADDITIONAL COMMENTS
PLOF was walking without device. 3 stairs to enter house, 12 inside to reach basement but is not required to use kitchen or bathroom.

## 2025-02-26 NOTE — DIETITIAN INITIAL EVALUATION ADULT - PERTINENT LABORATORY DATA
02-26    138  |  102  |  8[L]  ----------------------------<  156[H]  4.8   |  25  |  0.7    Ca    8.3[L]      26 Feb 2025 05:41  Phos  3.3     02-26  Mg     1.4     02-26    POCT Blood Glucose.: 186 mg/dL (02-25-25 @ 22:08)

## 2025-02-26 NOTE — PROGRESS NOTE ADULT - SUBJECTIVE AND OBJECTIVE BOX
SURGERY PROGRESS NOTE    24 HR EVENTS: Patient POD1 s/p exlap with Caroline's procedure. Patient tachy to HR of 113 max post-op, improved with better pain control. Patient has PCA pump at bedside. WV in place and functioning, NORY drain in place with SS fluid. A line removed in PACU, lanza in place. NGT in place to suction. Vitals otherwise WNL and patient saturating well on RA.    OBJECTIVE:  Vital Signs Last 24 Hrs  T(C): 36.7 (26 Feb 2025 00:20), Max: 37.2 (25 Feb 2025 18:50)  T(F): 98 (26 Feb 2025 00:20), Max: 98.9 (25 Feb 2025 18:50)  HR: 104 (26 Feb 2025 00:20) (101 - 112)  BP: 115/73 (26 Feb 2025 00:20) (115/73 - 194/77)  BP(mean): 77 (25 Feb 2025 11:04) (77 - 77)  RR: 18 (26 Feb 2025 00:20) (13 - 20)  SpO2: 98% (26 Feb 2025 00:20) (94% - 100%)    Parameters below as of 26 Feb 2025 00:20  Patient On (Oxygen Delivery Method): room air        I&O's Summary    25 Feb 2025 07:01  -  26 Feb 2025 02:30  --------------------------------------------------------  IN: 0 mL / OUT: 1095 mL / NET: -1095 mL        PHYSICAL EXAM:  GENERAL: NAD, awake and alert  CHEST/LUNG: Equal chest rise bilaterally, normal work of breathing on 2L NC  HEART: NSR, tachycardic  ABDOMEN: Soft, non-tender, non-distended. Incisions C/D/I, WV in place and functioning. NORY drain in place with SS fluid  EXTREMITIES:  2+ peripheral pulses b/l. No peripheral edema  NEUROLOGY: A&O x 3, no focal deficits    LABS:                        10.2   22.37 )-----------( 317      ( 24 Feb 2025 22:23 )             30.5     02-24    138  |  101  |  8[L]  ----------------------------<  78  3.5   |  23  |  0.8    Ca    8.8      24 Feb 2025 22:23  Phos  2.8     02-24  Mg     2.0     02-24      PT/INR - ( 24 Feb 2025 22:23 )   PT: 19.70 sec;   INR: 1.65 ratio         PTT - ( 24 Feb 2025 22:23 )  PTT:33.4 sec  Urinalysis Basic - ( 24 Feb 2025 22:23 )    Color: x / Appearance: x / SG: x / pH: x  Gluc: 78 mg/dL / Ketone: x  / Bili: x / Urobili: x   Blood: x / Protein: x / Nitrite: x   Leuk Esterase: x / RBC: x / WBC x   Sq Epi: x / Non Sq Epi: x / Bacteria: x        RADIOLOGY & ADDITIONAL STUDIES:

## 2025-02-26 NOTE — DIETITIAN INITIAL EVALUATION ADULT - NAME AND PHONE
Dea [phone] or TEAMS    Nutrition Interventions: Meals, snacks; Nutrition Monitoring: Diet order, weights, labs, NFPF, body composition, BM, SLP recs, GOC

## 2025-02-26 NOTE — DIETITIAN INITIAL EVALUATION ADULT - NSFNSGIIOFT_GEN_A_CORE
PAIN MANAGEMENTNEW PATIENT CONSULTATION  9/17/20    Abhinav Wilder  1945    ReferringProvider:  Wyatt Uriostegui MD  [address] [facility], [address] [facility]    Primary Care Physician:  Wyatt Uriostegui MD  [facility]    HPIinformation obtained from the patient as well as the Comprehensive Pain ManagementHealth Questionnaire filled out by the patient. The questionnaire will be scannedinto the electronic chart and PMH, PSH, meds, and allergies will be updates accordingly. Subjective:       CHIEF COMPLAINT:  This is a63 y.o. female patientwho presents with a complaint of Back Pain (back pain radiates down right legs and foot)      PAIN HPI:    Presents with  Severe charley hourses in  B legs R  Worse with visible knot  In elo R  Lasted 20 mintes x2  And sharp  Tailbone  Pain went to Urgent care then ER  For eval  Ultrasound neg   B lumbar pain   Worse with sitting    had R trochanteric bursa injection last month last  2-3 weeks    Patient complains  Of a sharp  Pain  Across  R knee several times a day  For about the same 2 month time period      Location: Back  Location Modifier: B  Severity of Pain: 7  Duration of Pain: Intermittent  Frequency of Pain: Intermittent  Aggravating Factors: Stretching, Bending, Straightening, Standing, Walking, Stairs, Exercise, Kneeling and Squatting  Limiting Behavior: no  Relieving Factors: Heat, Cold and Medication -  Result of Injury: no  Work Related Injury: no  [place] of Worker Compensation Case: no      Prior evaluation:    Previous lower back problems:No    Previous workup: No   History of surgery in painful area:No    Previous pain medication trials have included:       Opioids: -     NSAID's:-     Muscle relaxants: -     Neuropathicpain meds: -    Previous Pain Management Physician: No   Nerve blocks, spinal injections:No   Typeof Pain Intervention -. Date of last Pain Intervention  .  Was there pain relief from Pain Intervention: No.    How long was your pain relief from the Pain Intervention. Sleep:    Sleep disturbance: No   Difficultyfalling asleep:  No   Feels fatiguedmuch of the time: No   Wakes uprested: No   Awakens in themiddle of the night: No   Takes sleepingmedication: No    Mental health:    Patient feels - secondary to their current pain problems as described above. H/O depression and anxiety: No   Patient is not seeing psychologist orpsychiatrist   Abuse history? No    Employed? No  Alcohol use?no  Tobacco use?: No  Marijuana use?: No  Illicit drug use?: No    Imaging: Reviewed available imagingin our system with the patient. No results found. Referring physician records reviewed. Review of Systems   Constitutional: Positive for fatigue. HENT: Negative. Eyes: Negative. Respiratory: Negative. Cardiovascular: Negative. Gastrointestinal: Negative for constipation and diarrhea. Endocrine: Negative. Genitourinary: Negative for difficulty urinating and flank pain. Musculoskeletal: Positive for back pain and myalgias. Skin: Negative. Allergic/Immunologic: Negative. Neurological: Positive for weakness and numbness. Hematological: Negative. Psychiatric/Behavioral: Positive for sleep disturbance. Allergies   Allergen Reactions    Latex Rash    Aleve [Naproxen]      Stomach problems    Amoxicillin      Other reaction(s): mucositis    Aspirin Other (See Comments)     Dizziness and tears her stomach up    Dye [Iodides]      IV DYE    Influenza Vaccines     Reglan [Metoclopramide] Other (See Comments)     Dystonic reaction, involuntary movements       Outpatient Medications Prior to Visit   Medication Sig Dispense Refill    HYDROcodone-acetaminophen (NORCO) 5-325 MG per tablet Take 1 tablet by mouth 2 times daily as needed for Pain for up to 7 days.  14 tablet 0    sucralfate (CARAFATE) 1 GM tablet Take 1 tablet by mouth daily 30 tablet 5    gabapentin (NEURONTIN) 300 MG capsule Take 1 capsule by mouth 3 times /day. 270 capsule 0    metFORMIN (GLUCOPHAGE) 500 MG tablet take 1 tablet by mouth twice a day with meals 180 tablet 1    omeprazole (PRILOSEC) 20 MG delayed release capsule take 1 capsule by mouth twice a day 60 capsule 3    fluticasone (FLONASE) 50 MCG/ACT nasal spray instill 2 sprays into each nostril once daily 16 g 5    hydroxypropyl methylcellulose (GONIOSOL) 2.5 % ophthalmic solution Place 1 drop into both eyes 3 times daily 15 mL 5    hypromellose (SYSTANE OVERNIGHT THERAPY) 0.3 % GEL ophthalmic gel Place 1 inch into both eyes nightly (Patient taking differently: Place 1 inch into both eyes daily ) 1 Tube 3    blood glucose test strips (ONE TOUCH ULTRA TEST) strip use 1 TEST STRIP to TEST BLOOD SUGAR twice a day 100 strip 3    levothyroxine (SYNTHROID) 88 MCG tablet take 1 tablet by mouth once daily 90 tablet 3    blood glucose monitor kit and supplies Test 2 times a day & as needed for symptoms of irregular blood glucose. 1 kit 0    loratadine (CLARITIN) 10 MG tablet take 1 tablet by mouth once daily 30 tablet 11    SOFT TOUCH LANCETS MISC 1 Device by Does not apply route 2 times daily 200 each 3     No facility-administered medications prior to visit. Past Medical History:   Diagnosis Date    Allergic conjunctivitis     (stable)    Chronic sinusitis     Constipation     Dizzy spells     Dry eye syndrome     Edentulous     (does not wear dentures)    Essential hypertension 7/6/2016    Fracture of lateral malleolus     (avulsion fracture at the tip of the lateral malleolus - right ankle).     Hearing loss     Hypothyroidism     Interstitial cystitis     Irritable bowel syndrome     Keratitis     (secondary to dry eye syndrome)    Parotid gland enlargement     stasis, consider sjorgens    Pseudophakos     (bilateral)    Seasonal rhinitis     Stress incontinence     Type II or unspecified type diabetes mellitus without mention of complication, not stated as uncontrolled     Urethral stenosis     Xerostomia        Past Surgical History:   Procedure Laterality Date    APPENDECTOMY  1986    CATARACT REMOVAL WITH IMPLANT Right 2003    (Dr. Vasu Dumont)    CATARACT REMOVAL WITH IMPLANT Left 2003    (Dr. Vasu Dumont)    CHOLECYSTECTOMY  1986    COLONOSCOPY  14    normal    CYSTOURETHROSCOPY/URETHRAL DILATION  12    multiple    EYE SURGERY Bilateral 2018    Bilateral Transscleral cyclophotocoagulation G6 laser (micropulse) performed by Alysa [facility] MD at [facility]  1977    (Dr. Rashida Huang)   [facility] ENDOSCOPY  6/4/15    gastritis, biopsy x 2    UPPER GASTROINTESTINAL ENDOSCOPY  2018    Dr. Griffin Dorado   antral gastric ulcer. esophagus re-dilated at the end of procedure. Family History   Problem Relation Age of Onset    Heart Disease Mother     Heart Attack Mother     Arthritis Mother     Other Maternal Grandmother         (gout)    Allergies Daughter     Allergies Daughter     Other Brother         ( at age 21 secondary to auto accident).  Cataracts Neg Hx     Diabetes Neg Hx     Glaucoma Neg Hx      Social History     Socioeconomic History    Marital status: Single     Spouse name: None    Number of children: None    Years of education: None    Highest education level: None   Occupational History    None   Social Needs    Financial resource strain: None    Food insecurity     Worry: None     Inability: None    Transportation needs     Medical: None     Non-medical: None   Tobacco Use    Smoking status: Never Smoker    Smokeless tobacco: Never Used    Tobacco comment: Never smoked. Almita Manzo Rcp, 2016.    Substance and Sexual Activity    Alcohol use: No     Alcohol/week: 0.0 standard drinks    Drug use: No    Sexual activity: None   Lifestyle    Physical activity     Days per week: None     Minutes per session: None    Stress: None   Relationships    Social connections     Talks on phone: None     Gets together: None     Attends Islam service: None     Active member of club or organization: None     Attends meetings of clubs or organizations: None     Relationship status: None    Intimate partner violence     Fear of current or ex partner: None     Emotionally abused: None     Physically abused: None     Forced sexual activity: None   Other Topics Concern    None   Social History Narrative    None         Objective:     Physical Exam:  Vitals:    09/17/20 1402   BP: 122/72   Site: Right Upper Arm   Position: Sitting   Cuff Size: Medium Adult   Weight: 155 lb (70.3 kg)   Height: 5' 5\" (1.651 m)     Pain Score:  10 - Worst pain ever    Physical Exam  Constitutional:       General: She is not in acute distress. Appearance: Normal appearance. She is well-developed. She is not diaphoretic. HENT:      Head: Normocephalic and atraumatic. Right Ear: External ear normal. No decreased hearing noted. Left Ear: External ear normal. No decreased hearing noted. Nose: Nose normal.   Eyes:      General: Lids are normal. No scleral icterus. Conjunctiva/sclera: Conjunctivae normal.   Neck:      Trachea: Phonation normal.   Cardiovascular:      Comments: No BLE edema present  Pulmonary:      Effort: Pulmonary effort is normal. No accessory muscle usage or respiratory distress. Abdominal:      General: Abdomen is flat. Palpations: Abdomen is soft. Genitourinary:     Comments: deferred  Skin:     General: Skin is warm and dry. Coloration: Skin is not pale. Findings: No erythema or rash. Neurological:      General: No focal deficit present. Mental Status: She is alert and oriented to person, place, and time.    Psychiatric:         Mood and Affect: Mood normal.         Speech: Speech normal.         Behavior: Behavior normal.         Back Exam     Tenderness   The patient is experiencing tenderness in the lumbar. Range of Motion   Extension: normal   Flexion: normal   Lateral bend right: normal   Lateral bend left: normal   Rotation right: normal   Rotation left: normal     Muscle Strength   Right Quadriceps:  5/5   Left Quadriceps:  5/5   Right Hamstrings:  5/5   Left Hamstrings:  5/5     Tests   Straight leg raise right: positive  Straight leg raise left: negative    Other   Toe walk: normal  Heel walk: normal  Sensation: normal  Gait: normal     Comments:  +slump   mild fabers R   - [place]              Labs:   Lab Results   Component Value Date    WBC 6.5 09/10/2020    HGB 12.4 09/10/2020    HCT 40.7 09/10/2020     09/10/2020    CHOL 219 (H) 02/25/2020    TRIG 209 (H) 02/25/2020    HDL 74 02/25/2020    ALT 16 02/25/2020    AST 21 02/25/2020     09/10/2020    K 4.3 09/10/2020     09/10/2020    CREATININE 0.78 09/10/2020    BUN 19 09/10/2020    CO2 30 09/10/2020    TSH 0.23 (L) 02/25/2020    INR 0.9 07/14/2016    LABA1C 6.4 (H) 02/25/2020    LABMICR CANNOT BE CALCULATED 02/25/2020       Assessment: This is a 66 y.o. female with the following diagnosis:     Pain Diagnoses:  1. Lumbar radiculopathy    2. Spinal stenosis of lumbar region, unspecified whether neurogenic claudication present    3. Lumbosacral spondylosis without myelopathy    4. Chronic bilateral low back pain with bilateral sciatica    5. Acute pain of right knee        Medical/ Psychological Comorbidities:  As listed in the past medical and surgical history    Functional Limitations secondary to the above problems:  Chronic painlimits function and quality of life    Plan:   R knee steroid injection today   B L5 TFE x2   Patient states will talk with PCP regarding recent vertigo  1. Meds:   New Prescriptions    No medications on file      No orders of the defined types were placed in this encounter. Controlled Substances Monitoring:    [place]AR[place] report was reviewed for [place], [place].  Pt educated about the risks of taking opiates, including increasedsedation, constipation, slowed breathing, tolerance, dependence, and addiction. No orders of the defined types were placed in this encounter. No follow-ups on file. The patient expressed understanding of the above assessment and plan. Totaltime spent face to face with patient was 30 minutes inwhich  50% or more of the time was spent in counseling, education about risk andbenefits of the above plan, and coordination of care. Last BM not documented; abdominal discomfort noted, WDL, LLQ COLOSTOMY per flow sheet    02-25-25 @ 07:01  -  02-26-25 @ 07:00  --------------------------------------------------------  OUT:    Colostomy (mL): 0 mL    Nasogastric/Oral tube (mL): 0 mL  Total OUT: 0 mL    Total NET: 0 mL

## 2025-02-26 NOTE — PHYSICAL THERAPY INITIAL EVALUATION ADULT - GENERAL OBSERVATIONS, REHAB EVAL
PT IE 10:30-11am. Patient left in sitting in NAD. +call bell, +chair alarm, +wound vac and yanet drain attachment at abdominal area. Wife Milka present to observe functional mobility.

## 2025-02-26 NOTE — DIETITIAN INITIAL EVALUATION ADULT - PERTINENT MEDS FT
MEDICATIONS  (STANDING):  enoxaparin Injectable 40 milliGRAM(s) SubCutaneous every 24 hours  HYDROmorphone PCA (1 mG/mL) 30 milliLiter(s) PCA Continuous PCA Continuous  ketorolac   Injectable 15 milliGRAM(s) IV Push every 8 hours  lactated ringers. 1000 milliLiter(s) (115 mL/Hr) IV Continuous <Continuous>  pantoprazole  Injectable 40 milliGRAM(s) IV Push daily  piperacillin/tazobactam IVPB.. 3.375 Gram(s) IV Intermittent every 8 hours    MEDICATIONS  (PRN):  acetaminophen   IVPB .. 1000 milliGRAM(s) IV Intermittent once PRN Mild Pain (1 - 3)  naloxone Injectable 0.1 milliGRAM(s) IV Push every 3 minutes PRN For ANY of the following changes in patient status:  A. RR LESS THAN 10 breaths per minute, B. Oxygen saturation LESS THAN 90%, C. Sedation score of 6  ondansetron Injectable 4 milliGRAM(s) IV Push every 6 hours PRN Nausea

## 2025-02-26 NOTE — PROGRESS NOTE ADULT - SUBJECTIVE AND OBJECTIVE BOX
JOSE DE JESUS MYLES  70y, Female  Allergy: adhesives (Other)  No Known Drug Allergies      LOS  3d    CHIEF COMPLAINT: Diverticulitis of intestine with perforation or abscess without bleeding     (26 Feb 2025 12:16)      INTERVAL EVENTS/HPI  - No acute events overnight  - T(F): , Max: 98.9 (02-25-25 @ 18:50)  - s/p OR yesterday - NG tube in place  - WBC Count: 23.55 (02-26-25 @ 05:41)  WBC Count: 22.37 (02-24-25 @ 22:23)     - Creatinine: 0.7 (02-26-25 @ 05:41)  Creatinine: 0.8 (02-24-25 @ 22:23)       ROS  General: Denies rigors, nightsweats  HEENT: Denies headache, rhinorrhea, sore throat, eye pain  CV: Denies CP, palpitations  PULM: Denies wheezing, hemoptysis  GI: Denies hematemesis, hematochezia, melena  : Denies discharge, hematuria  MSK: Denies arthralgias, myalgias  SKIN: Denies rash, lesions  NEURO: Denies paresthesias, weakness  PSYCH: Denies depression, anxiety    VITALS:  T(F): 98.2, Max: 98.9 (02-25-25 @ 18:50)  HR: 102  BP: 138/77  RR: 18Vital Signs Last 24 Hrs  T(C): 36.8 (26 Feb 2025 09:33), Max: 37.2 (25 Feb 2025 18:50)  T(F): 98.2 (26 Feb 2025 09:33), Max: 98.9 (25 Feb 2025 18:50)  HR: 102 (26 Feb 2025 11:01) (101 - 116)  BP: 138/77 (26 Feb 2025 11:01) (115/73 - 194/77)  BP(mean): --  RR: 18 (26 Feb 2025 09:33) (13 - 20)  SpO2: 96% (26 Feb 2025 11:01) (94% - 100%)    Parameters below as of 26 Feb 2025 11:01  Patient On (Oxygen Delivery Method): room air        PHYSICAL EXAM:  Gen: NAD, resting in bed  HEENT: Normocephalic, atraumatic  Neck: supple, no lymphadenopathy  CV: Regular rate & regular rhythm  Lungs: decreased BS at bases, no fremitus  Abdomen: Soft, BS present; wound dressed - NORY drain in place with serosanguinous fluid   Ext: Warm, well perfused  Neuro: non focal, awake  Skin: no rash, no erythema  Lines: no phlebitis    FH: Non-contributory  Social Hx: Non-contributory    TESTS & MEASUREMENTS:                        10.9   23.55 )-----------( 341      ( 26 Feb 2025 05:41 )             32.3     02-26    138  |  102  |  8[L]  ----------------------------<  156[H]  4.8   |  25  |  0.7    Ca    8.3[L]      26 Feb 2025 05:41  Phos  3.3     02-26  Mg     1.4     02-26          Urinalysis Basic - ( 26 Feb 2025 05:41 )    Color: x / Appearance: x / SG: x / pH: x  Gluc: 156 mg/dL / Ketone: x  / Bili: x / Urobili: x   Blood: x / Protein: x / Nitrite: x   Leuk Esterase: x / RBC: x / WBC x   Sq Epi: x / Non Sq Epi: x / Bacteria: x        Urinalysis with Rflx Culture (collected 02-23-25 @ 09:25)        Lactate, Blood: 1.0 mmol/L (02-24-25 @ 11:20)  Lactate, Blood: 1.5 mmol/L (02-23-25 @ 06:15)      INFECTIOUS DISEASES TESTING      INFLAMMATORY MARKERS      RADIOLOGY & ADDITIONAL TESTS:  I have personally reviewed the last available Chest xray  CXR  Xray Chest 1 View- PORTABLE-Urgent:   ACC: [id number] EXAM:  XR CHEST PORTABLE URGENT 1V   ORDERED BY: BO MCDANIELS     PROCEDURE DATE:  02/24/2025          INTERPRETATION:  CLINICAL HISTORY: Preop    COMPARISON: None.    TECHNIQUE: None    FINDINGS:    Support devices: None.    Cardiac/mediastinum/hilum: Aortic calcifications.    Lung parenchyma/Pleura: No focal parenchymal opacities, pleural   effusions, or pneumothorax.    Skeleton/soft tissues: Right axillary surgical clips. Degenerative   changes of the spine noted.      IMPRESSION:    No radiographic evidence of acute cardiopulmonary disease.    --- End of Report ---            JIMENEZ SABA MD; Attending Radiologist  This document has been electronically signed. Feb 24 2025  2:10PM (02-24-25 @ 12:39)      CT      CARDIOLOGY TESTING  12 Lead ECG:   Ventricular Rate 111 BPM    Atrial Rate 111 BPM    P-R Interval 132 ms    QRS Duration 80 ms    Q-T Interval 330 ms    QTC Calculation(Bazett) 448 ms    P Axis 44 degrees    R Axis 6 degrees    T Axis 43 degrees    Diagnosis Line Sinus tachycardia  Minimal voltage criteria for LVH, may be normal variant ( R in aVL )  Borderline ECG    Confirmed by Vaughn Jack ([id number]) on 2/25/2025 6:43:00 PM (02-25-25 @ 00:53)  12 Lead ECG:   Ventricular Rate 112 BPM    Atrial Rate 112 BPM    P-R Interval 124 ms    QRS Duration 76 ms    Q-T Interval 326 ms    QTC Calculation(Bazett) 444 ms    P Axis 42 degrees    R Axis -3 degrees    T Axis 25 degrees    Diagnosis Line Sinus tachycardia  Left ventricular hypertrophy  Abnormal ECG    Confirmed by Vaughn Jack ([id number]) on 2/25/2025 4:23:50 PM (02-24-25 @ 12:04)      MEDICATIONS  enoxaparin Injectable 40 SubCutaneous every 24 hours  HYDROmorphone PCA (1 mG/mL) 30 PCA Continuous PCA Continuous  ketorolac   Injectable 15 IV Push every 8 hours  lactated ringers. 1000 IV Continuous <Continuous>  pantoprazole  Injectable 40 IV Push daily  piperacillin/tazobactam IVPB.. 3.375 IV Intermittent every 8 hours      WEIGHT  Weight (kg): 75.3 (02-25-25 @ 11:04)  Creatinine: 0.7 mg/dL (02-26-25 @ 05:41)      ANTIBIOTICS:  piperacillin/tazobactam IVPB.. 3.375 Gram(s) IV Intermittent every 8 hours      All available historical records have been reviewed

## 2025-02-26 NOTE — DIETITIAN INITIAL EVALUATION ADULT - EDUCATION DIETARY MODIFICATIONS
Discussed current diet order and supplements if appetite declines./(2) meets goals/outcomes/verbalization

## 2025-02-26 NOTE — PHYSICAL THERAPY INITIAL EVALUATION ADULT - NSACTIVITYREC_GEN_A_PT
Patient educated on knee extensions in sitting (repetitions as tolerated) and was encouraged to do daily to improve sit to stand transfers.

## 2025-02-26 NOTE — DIETITIAN INITIAL EVALUATION ADULT - OTHER INFO
Pertinent Medical Information: Per H&P, pt is a 71 y/o female w/ PMHx of breast cancer, diverticulitis. PSHx of hysterectomy. Pt presents with LLQ pain that occurred a few hours after eating last night follow by a few episodes of nbnb diarrhea. Denies nausea, vomiting, fever, chills. Pt endorses pain is similar to her previous episodes of diverticulitis. S/P exploratory laparotomy with raissa's procedure.    Per MD note on 2/25: Patient awake and alert. Diffuse abdominal pain, improved with pain medication, PCA at bedside. Incisions C/D/I. NORY drain in place. Colostomy with small amount of dark red blood. Patient denies N/V, dyspnea, CP, or palpitations. A-line removed, Engel in place     Pertinent Subjective Information: Pt NPO except medication at this time.     Weight Hx: -167#/75-75.9 KG -- checked 1 month ago per spouse. Current dosing weight is 75.3 KG. Previous admission weight per HIE was 74.8 KG on 8/17/24. Comparing previous admission weight with current dosing weight -- 0.6% unintentional weight gain in over 6 months compared to current dosing weight. Pt does not meet weight criteria for malnutrition at this time.

## 2025-02-26 NOTE — DIETITIAN INITIAL EVALUATION ADULT - ADD RECOMMEND
1. Continue with current diet order until medically feasible to advance diet. Once able to advance diet order, recommend consistent carbohydrate modifier (elevated POCT).     High risk f/u

## 2025-02-26 NOTE — DIETITIAN INITIAL EVALUATION ADULT - OTHER CALCULATIONS
Using ABW: ENERGY: 1871-9399 kcal/day (20-25 kcal/kg); PROTEIN: 75-90 g/day (1-1.2 g/kg); FLUID: 1027-8106 mL/day (25-30 mL/kg) -- with consideration for age, BMI, acuity of illness

## 2025-02-27 LAB
BASOPHILS # BLD AUTO: 0.04 K/UL — SIGNIFICANT CHANGE UP (ref 0–0.2)
BASOPHILS NFR BLD AUTO: 0.3 % — SIGNIFICANT CHANGE UP (ref 0–1)
EOSINOPHIL # BLD AUTO: 0.19 K/UL — SIGNIFICANT CHANGE UP (ref 0–0.7)
EOSINOPHIL NFR BLD AUTO: 1.6 % — SIGNIFICANT CHANGE UP (ref 0–8)
HCT VFR BLD CALC: 29.9 % — LOW (ref 37–47)
HGB BLD-MCNC: 9.6 G/DL — LOW (ref 12–16)
IMM GRANULOCYTES NFR BLD AUTO: 0.3 % — SIGNIFICANT CHANGE UP (ref 0.1–0.3)
LYMPHOCYTES # BLD AUTO: 19.9 % — LOW (ref 20.5–51.1)
LYMPHOCYTES # BLD AUTO: 2.43 K/UL — SIGNIFICANT CHANGE UP (ref 1.2–3.4)
MCHC RBC-ENTMCNC: 29.5 PG — SIGNIFICANT CHANGE UP (ref 27–31)
MCHC RBC-ENTMCNC: 32.1 G/DL — SIGNIFICANT CHANGE UP (ref 32–37)
MCV RBC AUTO: 92 FL — SIGNIFICANT CHANGE UP (ref 81–99)
MONOCYTES # BLD AUTO: 1.38 K/UL — HIGH (ref 0.1–0.6)
MONOCYTES NFR BLD AUTO: 11.3 % — HIGH (ref 1.7–9.3)
NEUTROPHILS # BLD AUTO: 8.14 K/UL — HIGH (ref 1.4–6.5)
NEUTROPHILS NFR BLD AUTO: 66.6 % — SIGNIFICANT CHANGE UP (ref 42.2–75.2)
NRBC BLD AUTO-RTO: 0 /100 WBCS — SIGNIFICANT CHANGE UP (ref 0–0)
PLATELET # BLD AUTO: 419 K/UL — HIGH (ref 130–400)
PMV BLD: 10.7 FL — HIGH (ref 7.4–10.4)
RBC # BLD: 3.25 M/UL — LOW (ref 4.2–5.4)
RBC # FLD: 13.2 % — SIGNIFICANT CHANGE UP (ref 11.5–14.5)
WBC # BLD: 12.22 K/UL — HIGH (ref 4.8–10.8)
WBC # FLD AUTO: 12.22 K/UL — HIGH (ref 4.8–10.8)

## 2025-02-27 PROCEDURE — 99024 POSTOP FOLLOW-UP VISIT: CPT

## 2025-02-27 RX ORDER — KETOROLAC TROMETHAMINE 30 MG/ML
15 INJECTION, SOLUTION INTRAMUSCULAR; INTRAVENOUS EVERY 8 HOURS
Refills: 0 | Status: DISCONTINUED | OUTPATIENT
Start: 2025-02-27 | End: 2025-02-27

## 2025-02-27 RX ORDER — OXYCODONE HYDROCHLORIDE 30 MG/1
5 TABLET ORAL EVERY 6 HOURS
Refills: 0 | Status: DISCONTINUED | OUTPATIENT
Start: 2025-02-27 | End: 2025-03-04

## 2025-02-27 RX ORDER — ACETAMINOPHEN 500 MG/5ML
650 LIQUID (ML) ORAL EVERY 6 HOURS
Refills: 0 | Status: DISCONTINUED | OUTPATIENT
Start: 2025-02-27 | End: 2025-03-04

## 2025-02-27 RX ADMIN — Medication 25 GRAM(S): at 11:27

## 2025-02-27 RX ADMIN — Medication 25 GRAM(S): at 03:52

## 2025-02-27 RX ADMIN — KETOROLAC TROMETHAMINE 15 MILLIGRAM(S): 30 INJECTION, SOLUTION INTRAMUSCULAR; INTRAVENOUS at 06:29

## 2025-02-27 RX ADMIN — Medication 85 MILLIMOLE(S): at 00:35

## 2025-02-27 RX ADMIN — Medication 650 MILLIGRAM(S): at 18:47

## 2025-02-27 RX ADMIN — Medication 40 MILLIGRAM(S): at 11:27

## 2025-02-27 RX ADMIN — Medication 650 MILLIGRAM(S): at 11:27

## 2025-02-27 RX ADMIN — Medication 25 GRAM(S): at 18:17

## 2025-02-27 RX ADMIN — ENOXAPARIN SODIUM 40 MILLIGRAM(S): 100 INJECTION SUBCUTANEOUS at 18:17

## 2025-02-27 RX ADMIN — Medication 650 MILLIGRAM(S): at 12:12

## 2025-02-27 RX ADMIN — Medication 650 MILLIGRAM(S): at 18:17

## 2025-02-27 NOTE — ADVANCED PRACTICE NURSE CONSULT - RECOMMEDATIONS
Pouch change: 	  -Gently remove pouch with water moistened gauze   -Cleanse stoma site with water moistened gauze, gently pat dry  -Protect peristoma skin with Cavilon skin prep, stoma powder and or stoma paste if needed  -Measure stoma  -Trace and cut current size on drainage sytem for each stoma per reference above   -Stretch Perfecto Adapt CeraRing (#3132) onto back of barrier or use stoma paste if needed  -Apply barrier to patient's skin   -Apply pouch    Empty pouch when 1/3 to no more than 1/2 full of effluent/flatus.   Change pouching system q 3 - 4 days and prn for leaking.   Continue teaching at bedside until  discharge and follow up with  Home health nurse  Case discussed with primary RN  Ostomy specialist to follow up as needed        Supplies include:  Perfecto  2 3/4” CeraPlus flat barrier #[id number]  Chester 2 3/4"” drainable pouch #[id number]  Perfecto Adapt flat CeraRing #[id number]  Chester adhesive remover #0460  Chester Adapt stoma powder #7906  3M Cavilon no-sting barrier film #[id number]  m9 odor eliminator drops #[id number]  Adapt Medical Adhesive Remover Spray/Adapt Universal Remover Wipe

## 2025-02-27 NOTE — ADVANCED PRACTICE NURSE CONSULT - ASSESSMENT
History of Present Illness:   Pt is a 70y female PMHx of breast cancer, diverticulitis. PSHx of hysterectomy. Pt presents with LLQ pain that occurred a few hours after eating last night follow by a few episodes of nbnb diarrhea. Denies nausea, vomiting, fever, chills. Pt endorses pain is similar to her previous episodes of diverticulitis. Pt was admitted to surgery service in 08/2024 for management of sigmoid diverticulitis in which she did well on a course of IV antibiotics, and diet. After discharge, pt underwent colonoscopy which was normal. CT A/P show 4.5 x 2.9 cm focus of gas with a small air-fluid level seen adjacent to the sigmoid colon suggestive of a contained perforation. Mild adjacent fat stranding, which may be related to underlying diverticulitis. Labs significant for WBC 13.72. Vitals , /89, Temperature 99.9.        Received patient in bed for preop stoma marking for end colostomy . Patient's abdomen assessed in laying and sitting position, abdomen soft , rectus muscle palpated, patient's belt line noted. Patient's stoma sited in the left upper and left lower quadrant within the rectus muscle, void of any scarring, dimpling, creasing, bony prominences or patient's belt line.  Current site visualized in laying and sitting positions, Tegaderm dressings applied over markings.  Final stoma site per surgeons discretion.     Case discussed with primary RN
History of Present Illness:   Pt is a 70y female PMHx of breast cancer, diverticulitis. PSHx of hysterectomy. Pt presents with LLQ pain that occurred a few hours after eating last night follow by a few episodes of nbnb diarrhea. Denies nausea, vomiting, fever, chills. Pt endorses pain is similar to her previous episodes of diverticulitis. Pt was admitted to surgery service in 08/2024 for management of sigmoid diverticulitis in which she did well on a course of IV antibiotics, and diet. After discharge, pt underwent colonoscopy which was normal. CT A/P show 4.5 x 2.9 cm focus of gas with a small air-fluid level seen adjacent to the sigmoid colon suggestive of a contained perforation. Mild adjacent fat stranding, which may be related to underlying diverticulitis. Labs significant for WBC 13.72. Vitals , /89, Temperature 99.9.  Received patient laying supine in bed, awake, A&Ox3, made aware of purpose of WOCN visit, agreeable to consult. Ostomy to -  w/ -  pouching system in place, barrier intact. Pouch gently removed from pt's abdomen w/ water moistened gauze.     Type of ostomy: End Colostomy    Location: LLQ  Joni: No  Size: ~38mm  Mucosa: pink  Peristomal skin: unable to assess – wafer intact and in place  Mucocutaneous junction: intact  Abdominal contours: distended  Output: thin, sanguineous   Midline: Prevana dressing    Patients pouch intact at time of assessment.  [employer] video watched and discussed – all questions answered at this time.   Perfecto 2 ¾” CeraPlus flat barrier #[id number] (cut to ~38mm ), [employer] Adapt flat CeraRing #[id number], and Perfecto  2 ¾” drainable pouch w/ lock & roll closure #[id number]. Full ostomy lesson and [employer] instructional booklet provided to patient.  Patient enrolled in [employer].

## 2025-02-27 NOTE — PROGRESS NOTE ADULT - SUBJECTIVE AND OBJECTIVE BOX
GENERAL SURGERY PROGRESS NOTE    Patient: JOSE DE JESUS MYLES , 70y ([birth year])Female   MRN: [id number]  Location: [address]  Visit: 02-23-25 Inpatient  Date: 02-27-25 @ 10:38    Hospital Day #: 5  Post-Op Day #: 2      Events of past 24 hours:  Patient seen and examined  Tachycardic to 110  NGT removed  Pain is controlled  Passed TOV      PAST MEDICAL & SURGICAL HISTORY:  Breast cancer  Diverticulitis  History of hysteroscopy  H/O: hysterectomy    Vitals:   T(F): 98 (02-27-25 @ 07:34), Max: 98 (02-27-25 @ 07:34)  HR: 100 (02-27-25 @ 07:34)  BP: 118/74 (02-27-25 @ 07:34)  RR: 18 (02-27-25 @ 07:34)  SpO2: 94% (02-27-25 @ 07:34)      Diet, Clear Liquid:   Supplement Feeding Modality:  Oral  Ensure Clear Cans or Servings Per Day:  1       Frequency:  Three Times a day      Fluids:     I & O's:    02-26-25 @ 07:01  -  02-27-25 @ 07:00  --------------------------------------------------------  IN:    Lactated Ringers: 1380 mL  Total IN: 1380 mL    OUT:    Colostomy (mL): 100 mL    Drain (mL): 240 mL    Indwelling Catheter - Urethral (mL): 900 mL    Voided (mL): 1300 mL  Total OUT: 2540 mL    Total NET: -1160 mL        PHYSICAL EXAM:  General: NAD  HEENT: NCAT  Respiratory: normal respiratory effort  Abdomen: Soft, non-distended, non-tender, no rebound, no guarding. Incisions c/d/i. NORY drain in place SS output. Ostomy with stool.   Vascular: extremities well perfused  Skin: Warm/dry, normal color, no jaundice      MEDICATIONS  (STANDING):  acetaminophen     Tablet .. 650 milliGRAM(s) Oral every 6 hours  enoxaparin Injectable 40 milliGRAM(s) SubCutaneous every 24 hours  lactated ringers. 1000 milliLiter(s) (115 mL/Hr) IV Continuous <Continuous>  pantoprazole  Injectable 40 milliGRAM(s) IV Push daily  piperacillin/tazobactam IVPB.. 3.375 Gram(s) IV Intermittent every 8 hours    MEDICATIONS  (PRN):  ketorolac   Injectable 15 milliGRAM(s) IV Push every 8 hours PRN Moderate Pain (4 - 6)  naloxone Injectable 0.1 milliGRAM(s) IV Push every 3 minutes PRN For ANY of the following changes in patient status:  A. RR LESS THAN 10 breaths per minute, B. Oxygen saturation LESS THAN 90%, C. Sedation score of 6  ondansetron Injectable 4 milliGRAM(s) IV Push every 6 hours PRN Nausea  oxyCODONE    IR 5 milliGRAM(s) Oral every 6 hours PRN Severe Pain (7 - 10)      DVT PROPHYLAXIS: enoxaparin Injectable 40 milliGRAM(s) SubCutaneous every 24 hours    GI PROPHYLAXIS: pantoprazole  Injectable 40 milliGRAM(s) IV Push daily    ANTICOAGULATION:   ANTIBIOTICS:  piperacillin/tazobactam IVPB.. 3.375 Gram(s)            LAB/STUDIES:  Labs:  CAPILLARY BLOOD GLUCOSE                              9.1    18.56 )-----------( 334      ( 26 Feb 2025 20:45 )             27.8       Auto Immature Granulocyte %: 0.6 % (02-26-25 @ 20:45)    02-26    140  |  103  |  10  ----------------------------<  116[H]  4.3   |  26  |  0.7      Calcium: 8.1 mg/dL (02-26-25 @ 20:45)      LFTs:     Lactate, Blood: 1.0 mmol/L (02-24-25 @ 11:20)      Coags:            Urinalysis Basic - ( 26 Feb 2025 20:45 )    Color: x / Appearance: x / SG: x / pH: x  Gluc: 116 mg/dL / Ketone: x  / Bili: x / Urobili: x   Blood: x / Protein: x / Nitrite: x   Leuk Esterase: x / RBC: x / WBC x   Sq Epi: x / Non Sq Epi: x / Bacteria: x            ASSESSMENT/PLAN:   70y y/o  Female  s/p exploratory laparotomy with raissa's procedure    PLAN:  - D/c PCA pump  - Multimodal pain control  - Encourage OOB, IS  - Stair training with PT  - CLD

## 2025-02-28 LAB
ANION GAP SERPL CALC-SCNC: 11 MMOL/L — SIGNIFICANT CHANGE UP (ref 7–14)
BUN SERPL-MCNC: 7 MG/DL — LOW (ref 10–20)
CALCIUM SERPL-MCNC: 8.1 MG/DL — LOW (ref 8.4–10.5)
CHLORIDE SERPL-SCNC: 106 MMOL/L — SIGNIFICANT CHANGE UP (ref 98–110)
CO2 SERPL-SCNC: 27 MMOL/L — SIGNIFICANT CHANGE UP (ref 17–32)
CREAT SERPL-MCNC: 0.7 MG/DL — SIGNIFICANT CHANGE UP (ref 0.7–1.5)
EGFR: 92 ML/MIN/1.73M2 — SIGNIFICANT CHANGE UP
EGFR: 92 ML/MIN/1.73M2 — SIGNIFICANT CHANGE UP
GLUCOSE SERPL-MCNC: 127 MG/DL — HIGH (ref 70–99)
MAGNESIUM SERPL-MCNC: 1.8 MG/DL — SIGNIFICANT CHANGE UP (ref 1.8–2.4)
PHOSPHATE SERPL-MCNC: 2.9 MG/DL — SIGNIFICANT CHANGE UP (ref 2.1–4.9)
POTASSIUM SERPL-MCNC: 3.8 MMOL/L — SIGNIFICANT CHANGE UP (ref 3.5–5)
POTASSIUM SERPL-SCNC: 3.8 MMOL/L — SIGNIFICANT CHANGE UP (ref 3.5–5)
SODIUM SERPL-SCNC: 144 MMOL/L — SIGNIFICANT CHANGE UP (ref 135–146)

## 2025-02-28 PROCEDURE — 99024 POSTOP FOLLOW-UP VISIT: CPT

## 2025-02-28 RX ORDER — MAGNESIUM SULFATE 500 MG/ML
2 SYRINGE (ML) INJECTION ONCE
Refills: 0 | Status: COMPLETED | OUTPATIENT
Start: 2025-02-28 | End: 2025-02-28

## 2025-02-28 RX ORDER — POLYETHYLENE GLYCOL 3350 17 G/17G
17 POWDER, FOR SOLUTION ORAL DAILY
Refills: 0 | Status: DISCONTINUED | OUTPATIENT
Start: 2025-02-28 | End: 2025-03-04

## 2025-02-28 RX ORDER — POTASSIUM PHOSPHATE, MONOBASIC POTASSIUM PHOSPHATE, DIBASIC INJECTION, 236; 224 MG/ML; MG/ML
15 SOLUTION, CONCENTRATE INTRAVENOUS ONCE
Refills: 0 | Status: COMPLETED | OUTPATIENT
Start: 2025-02-28 | End: 2025-02-28

## 2025-02-28 RX ADMIN — Medication 40 MILLIGRAM(S): at 05:14

## 2025-02-28 RX ADMIN — POTASSIUM PHOSPHATE, MONOBASIC POTASSIUM PHOSPHATE, DIBASIC INJECTION, 63.75 MILLIMOLE(S): 236; 224 SOLUTION, CONCENTRATE INTRAVENOUS at 05:13

## 2025-02-28 RX ADMIN — Medication 650 MILLIGRAM(S): at 11:36

## 2025-02-28 RX ADMIN — Medication 25 GRAM(S): at 05:13

## 2025-02-28 RX ADMIN — Medication 650 MILLIGRAM(S): at 18:03

## 2025-02-28 RX ADMIN — Medication 25 GRAM(S): at 11:38

## 2025-02-28 RX ADMIN — Medication 650 MILLIGRAM(S): at 05:14

## 2025-02-28 RX ADMIN — Medication 650 MILLIGRAM(S): at 12:06

## 2025-02-28 RX ADMIN — Medication 25 GRAM(S): at 18:04

## 2025-02-28 RX ADMIN — POLYETHYLENE GLYCOL 3350 17 GRAM(S): 17 POWDER, FOR SOLUTION ORAL at 11:36

## 2025-02-28 RX ADMIN — ENOXAPARIN SODIUM 40 MILLIGRAM(S): 100 INJECTION SUBCUTANEOUS at 18:03

## 2025-02-28 RX ADMIN — Medication 25 GRAM(S): at 05:14

## 2025-02-28 NOTE — PROGRESS NOTE ADULT - SUBJECTIVE AND OBJECTIVE BOX
JOSE DE JESUS MYLES  70y, Female  Allergy: adhesives (Other)  No Known Drug Allergies      LOS  5d    CHIEF COMPLAINT: contained perforated sigmoid diverticulitis (28 Feb 2025 03:20)      INTERVAL EVENTS/HPI  - No acute events overnight  - T(F): , Max: 98.9 (02-28-25 @ 00:29)  - pain controlled - WBC downtrending   - WBC Count: 12.22 (02-27-25 @ 20:00)  WBC Count: 18.56 (02-26-25 @ 20:45)     - Creatinine: 0.7 (02-27-25 @ 20:00)  Creatinine: 0.7 (02-26-25 @ 20:45)       ROS  General: Denies rigors, nightsweats  HEENT: Denies headache, rhinorrhea, sore throat, eye pain  CV: Denies CP, palpitations  PULM: Denies wheezing, hemoptysis  GI: Denies hematemesis, hematochezia, melena  : Denies discharge, hematuria  MSK: Denies arthralgias, myalgias  SKIN: Denies rash, lesions  NEURO: Denies paresthesias, weakness  PSYCH: Denies depression, anxiety    VITALS:  T(F): 98.3, Max: 98.9 (02-28-25 @ 00:29)  HR: 83  BP: 153/84  RR: 19Vital Signs Last 24 Hrs  T(C): 36.8 (28 Feb 2025 16:23), Max: 37.2 (28 Feb 2025 00:29)  T(F): 98.3 (28 Feb 2025 16:23), Max: 98.9 (28 Feb 2025 00:29)  HR: 83 (28 Feb 2025 16:23) (83 - 112)  BP: 153/84 (28 Feb 2025 16:23) (133/76 - 153/84)  BP(mean): --  RR: 19 (28 Feb 2025 16:23) (18 - 19)  SpO2: 99% (28 Feb 2025 16:23) (96% - 99%)    Parameters below as of 28 Feb 2025 16:23  Patient On (Oxygen Delivery Method): room air        PHYSICAL EXAM:  Gen: NAD, resting in bed  HEENT: Normocephalic, atraumatic  Neck: supple, no lymphadenopathy  CV: Regular rate & regular rhythm  Lungs: decreased BS at bases, no fremitus  Abdomen: Soft, BS present  Ext: Warm, well perfused  Neuro: non focal, awake  Skin: no rash, no erythema  Lines: no phlebitis    FH: Non-contributory  Social Hx: Non-contributory    TESTS & MEASUREMENTS:                        9.6    12.22 )-----------( 419      ( 27 Feb 2025 20:00 )             29.9     02-27    144  |  106  |  7[L]  ----------------------------<  127[H]  3.8   |  27  |  0.7    Ca    8.1[L]      27 Feb 2025 20:00  Phos  2.9     02-27  Mg     1.8     02-27          Urinalysis Basic - ( 27 Feb 2025 20:00 )    Color: x / Appearance: x / SG: x / pH: x  Gluc: 127 mg/dL / Ketone: x  / Bili: x / Urobili: x   Blood: x / Protein: x / Nitrite: x   Leuk Esterase: x / RBC: x / WBC x   Sq Epi: x / Non Sq Epi: x / Bacteria: x        Urinalysis with Rflx Culture (collected 02-23-25 @ 09:25)        Lactate, Blood: 1.0 mmol/L (02-24-25 @ 11:20)      INFECTIOUS DISEASES TESTING      INFLAMMATORY MARKERS      RADIOLOGY & ADDITIONAL TESTS:  I have personally reviewed the last available Chest xray  CXR      CT      CARDIOLOGY TESTING  12 Lead ECG:   Ventricular Rate 107 BPM    Atrial Rate 107 BPM    P-R Interval 118 ms    QRS Duration 76 ms    Q-T Interval 342 ms    QTC Calculation(Bazett) 456 ms    P Axis 39 degrees    R Axis 0 degrees    T Axis 30 degrees    Diagnosis Line Sinus tachycardia  Minimal voltage criteria for LVH, may be normal variant ( R in aVL )  Possible Anterior infarct , age undetermined  Abnormal ECG    Confirmed by Rayshawn Marcus ([id number]) on 2/27/2025 9:19:30 AM (02-25-25 @ 22:47)  12 Lead ECG:   Ventricular Rate 111 BPM    Atrial Rate 111 BPM    P-R Interval 132 ms    QRS Duration 80 ms    Q-T Interval 330 ms    QTC Calculation(Bazett) 448 ms    P Axis 44 degrees    R Axis 6 degrees    T Axis 43 degrees    Diagnosis Line Sinus tachycardia  Minimal voltage criteria for LVH, may be normal variant ( R in aVL )  Borderline ECG    Confirmed by Vaughn Jack ([id number]) on 2/25/2025 6:43:00 PM (02-25-25 @ 00:53)      MEDICATIONS  acetaminophen     Tablet .. 650 Oral every 6 hours  enoxaparin Injectable 40 SubCutaneous every 24 hours  pantoprazole    Tablet 40 Oral before breakfast  piperacillin/tazobactam IVPB.. 3.375 IV Intermittent every 8 hours  polyethylene glycol 3350 17 Oral daily      WEIGHT  Weight (kg): 75.3 (02-25-25 @ 11:04)  Creatinine: 0.7 mg/dL (02-27-25 @ 20:00)      ANTIBIOTICS:  piperacillin/tazobactam IVPB.. 3.375 Gram(s) IV Intermittent every 8 hours      All available historical records have been reviewed

## 2025-02-28 NOTE — PROGRESS NOTE ADULT - SUBJECTIVE AND OBJECTIVE BOX
GENERAL SURGERY PROGRESS NOTE     JOSE DE JESUS MYLES  [address] [place] [address] day :6d    POD:3d  Procedure: Exploratory laparotomy with Raissa procedure      Surgical Attending: Jj Stein  Overnight events: No acute events overnight. Pt is tolerating CLD with no nausea or vomiting. Ostomy is pink and well perfused with bowel sweat in appliance. Pt tachycardic to 112 overnight.     T(F): 98.9 (02-28-25 @ 00:29), Max: 98.9 (02-28-25 @ 00:29)  HR: 112 (02-28-25 @ 00:29) (94 - 113)  BP: 133/76 (02-28-25 @ 00:29) (118/74 - 133/76)  ABP: --  ABP(mean): --  RR: 18 (02-28-25 @ 00:29) (18 - 19)  SpO2: 97% (02-28-25 @ 00:29) (94% - 97%)    IN'S / OUT's:    02-26-25 @ 07:01  -  02-27-25 @ 07:00  --------------------------------------------------------  IN:    Lactated Ringers: 1380 mL  Total IN: 1380 mL    OUT:    Colostomy (mL): 100 mL    Drain (mL): 240 mL    Indwelling Catheter - Urethral (mL): 900 mL    Voided (mL): 1300 mL  Total OUT: 2540 mL    Total NET: -1160 mL      02-27-25 @ 07:01  -  02-28-25 @ 03:21  --------------------------------------------------------  IN:  Total IN: 0 mL    OUT:    Drain (mL): 235 mL  Total OUT: 235 mL    Total NET: -235 mL          PHYSICAL EXAM:  GENERAL: NAD  CHEST/LUNG: equal chest rise b/l  HEART: Regular rate and rhythm  ABDOMEN: Soft, minimally tender, Nondistended; Ostomy pink and well perfused with bowel sweat in appliance  EXTREMITIES:  No clubbing, cyanosis, or edema      LABS  Labs:  CAPILLARY BLOOD GLUCOSE                              9.6    12.22 )-----------( 419      ( 27 Feb 2025 20:00 )             29.9       Auto Immature Granulocyte %: 0.3 % (02-27-25 @ 20:00)    02-27    144  |  106  |  7[L]  ----------------------------<  127[H]  3.8   |  27  |  0.7      Calcium: 8.1 mg/dL (02-27-25 @ 20:00)      LFTs:         Coags:            Urinalysis Basic - ( 27 Feb 2025 20:00 )    Color: x / Appearance: x / SG: x / pH: x  Gluc: 127 mg/dL / Ketone: x  / Bili: x / Urobili: x   Blood: x / Protein: x / Nitrite: x   Leuk Esterase: x / RBC: x / WBC x   Sq Epi: x / Non Sq Epi: x / Bacteria: x            RADIOLOGY & ADDITIONAL TESTS:      A/P:  JOSE DE JESUS MYLES is a  70y y/o  Female  s/p exploratory laparotomy with raissa's procedure        PLAN:   - monitor ostomy output quality and quantity   - monitor NORY drain quality and quality   - continue zosyn  - DVT and GI ppx  - daily labs, replete electrolytes as needed   - multi modal pain control   - ostomy RN recs appreciated   - encourage ambulation and IS as tolerated   - f/u CM/SW for dispo planning       #Antibiotics: piperacillin/tazobactam IVPB.. 3.375 Gram(s) IV Intermittent every 8 hours, 02-25-25 @ 19:00   Day **  #DVT ppx: enoxaparin Injectable 40 milliGRAM(s) SubCutaneous every 24 hours    #GI ppx: pantoprazole    Tablet 40 milliGRAM(s) Oral before breakfast    Disposition:  4C    Above plan to be discussed with Attending Surgeon Dr. Aviles  , patient, patient family, and rest of health care team    TAP (Trauma, Acute care, Pediatrics) Spectra [phone]

## 2025-03-01 PROCEDURE — 99024 POSTOP FOLLOW-UP VISIT: CPT

## 2025-03-01 RX ORDER — METOPROLOL SUCCINATE 50 MG/1
25 TABLET, EXTENDED RELEASE ORAL EVERY 12 HOURS
Refills: 0 | Status: DISCONTINUED | OUTPATIENT
Start: 2025-03-01 | End: 2025-03-02

## 2025-03-01 RX ORDER — LABETALOL HYDROCHLORIDE 200 MG/1
10 TABLET, FILM COATED ORAL ONCE
Refills: 0 | Status: COMPLETED | OUTPATIENT
Start: 2025-03-01 | End: 2025-03-01

## 2025-03-01 RX ADMIN — LABETALOL HYDROCHLORIDE 10 MILLIGRAM(S): 200 TABLET, FILM COATED ORAL at 09:35

## 2025-03-01 RX ADMIN — Medication 40 MILLIGRAM(S): at 05:27

## 2025-03-01 RX ADMIN — ENOXAPARIN SODIUM 40 MILLIGRAM(S): 100 INJECTION SUBCUTANEOUS at 17:46

## 2025-03-01 RX ADMIN — Medication 650 MILLIGRAM(S): at 17:46

## 2025-03-01 RX ADMIN — Medication 650 MILLIGRAM(S): at 11:14

## 2025-03-01 RX ADMIN — Medication 25 GRAM(S): at 19:42

## 2025-03-01 RX ADMIN — METOPROLOL SUCCINATE 25 MILLIGRAM(S): 50 TABLET, EXTENDED RELEASE ORAL at 19:42

## 2025-03-01 RX ADMIN — POLYETHYLENE GLYCOL 3350 17 GRAM(S): 17 POWDER, FOR SOLUTION ORAL at 11:15

## 2025-03-01 RX ADMIN — Medication 25 GRAM(S): at 10:45

## 2025-03-01 RX ADMIN — Medication 25 GRAM(S): at 05:26

## 2025-03-01 RX ADMIN — Medication 650 MILLIGRAM(S): at 05:27

## 2025-03-01 NOTE — PROVIDER CONTACT NOTE (OTHER) - SITUATION
Pt refused to sign Intent for d/c on 2/28/2025. Pt saying unsure if she feels 100% to leave.
Pt's BP reading 205/103, . MD Hernandez made aware.

## 2025-03-01 NOTE — PROGRESS NOTE ADULT - SUBJECTIVE AND OBJECTIVE BOX
SURGERY PROGRESS NOTE    24 HR EVENTS: Patient with 1 episode of vomiting yesterday afternoon. No further episodes of vomiting nausea has since resolved. Miralax started for low ostomy output. Only gas and minimal bowel sweat from ostomy currently. Patient saturating well on RA, low grade tachycardia (baseline for patient), vitals otherwise WNL. Labs stable.    OBJECTIVE:  Vital Signs Last 24 Hrs  T(C): 37.1 (01 Mar 2025 00:24), Max: 37.1 (01 Mar 2025 00:24)  T(F): 98.7 (01 Mar 2025 00:24), Max: 98.7 (01 Mar 2025 00:24)  HR: 103 (01 Mar 2025 00:24) (83 - 109)  BP: 157/94 (01 Mar 2025 00:24) (146/68 - 157/94)  BP(mean): --  RR: 18 (01 Mar 2025 00:24) (18 - 19)  SpO2: 99% (01 Mar 2025 00:24) (96% - 99%)    Parameters below as of 28 Feb 2025 16:23  Patient On (Oxygen Delivery Method): room air        I&O's Summary    27 Feb 2025 07:01  -  28 Feb 2025 07:00  --------------------------------------------------------  IN: 0 mL / OUT: 475 mL / NET: -475 mL    28 Feb 2025 07:01  -  01 Mar 2025 04:24  --------------------------------------------------------  IN: 0 mL / OUT: 175 mL / NET: -175 mL        PHYSICAL EXAM:  GENERAL: NAD  CHEST/LUNG: equal chest rise b/l  HEART: Regular rate and rhythm  ABDOMEN: Soft, minimally tender, Nondistended; Ostomy pink and well perfused with bowel sweat in appliance  EXTREMITIES:  No clubbing, cyanosis, or edema      LABS:                        9.6    12.22 )-----------( 419      ( 27 Feb 2025 20:00 )             29.9     02-27    144  |  106  |  7[L]  ----------------------------<  127[H]  3.8   |  27  |  0.7    Ca    8.1[L]      27 Feb 2025 20:00  Phos  2.9     02-27  Mg     1.8     02-27        Urinalysis Basic - ( 27 Feb 2025 20:00 )    Color: x / Appearance: x / SG: x / pH: x  Gluc: 127 mg/dL / Ketone: x  / Bili: x / Urobili: x   Blood: x / Protein: x / Nitrite: x   Leuk Esterase: x / RBC: x / WBC x   Sq Epi: x / Non Sq Epi: x / Bacteria: x        RADIOLOGY & ADDITIONAL STUDIES:

## 2025-03-02 LAB
ANION GAP SERPL CALC-SCNC: 12 MMOL/L — SIGNIFICANT CHANGE UP (ref 7–14)
ANION GAP SERPL CALC-SCNC: 8 MMOL/L — SIGNIFICANT CHANGE UP (ref 7–14)
BASOPHILS # BLD AUTO: 0.03 K/UL — SIGNIFICANT CHANGE UP (ref 0–0.2)
BASOPHILS # BLD AUTO: 0.04 K/UL — SIGNIFICANT CHANGE UP (ref 0–0.2)
BASOPHILS NFR BLD AUTO: 0.2 % — SIGNIFICANT CHANGE UP (ref 0–1)
BASOPHILS NFR BLD AUTO: 0.4 % — SIGNIFICANT CHANGE UP (ref 0–1)
BUN SERPL-MCNC: 4 MG/DL — LOW (ref 10–20)
BUN SERPL-MCNC: 4 MG/DL — LOW (ref 10–20)
CALCIUM SERPL-MCNC: 8.9 MG/DL — SIGNIFICANT CHANGE UP (ref 8.4–10.5)
CALCIUM SERPL-MCNC: 8.9 MG/DL — SIGNIFICANT CHANGE UP (ref 8.4–10.5)
CHLORIDE SERPL-SCNC: 101 MMOL/L — SIGNIFICANT CHANGE UP (ref 98–110)
CHLORIDE SERPL-SCNC: 102 MMOL/L — SIGNIFICANT CHANGE UP (ref 98–110)
CO2 SERPL-SCNC: 27 MMOL/L — SIGNIFICANT CHANGE UP (ref 17–32)
CO2 SERPL-SCNC: 28 MMOL/L — SIGNIFICANT CHANGE UP (ref 17–32)
CREAT SERPL-MCNC: 0.6 MG/DL — LOW (ref 0.7–1.5)
CREAT SERPL-MCNC: 0.7 MG/DL — SIGNIFICANT CHANGE UP (ref 0.7–1.5)
EGFR: 92 ML/MIN/1.73M2 — SIGNIFICANT CHANGE UP
EGFR: 92 ML/MIN/1.73M2 — SIGNIFICANT CHANGE UP
EGFR: 95 ML/MIN/1.73M2 — SIGNIFICANT CHANGE UP
EGFR: 95 ML/MIN/1.73M2 — SIGNIFICANT CHANGE UP
EOSINOPHIL # BLD AUTO: 0.29 K/UL — SIGNIFICANT CHANGE UP (ref 0–0.7)
EOSINOPHIL # BLD AUTO: 0.36 K/UL — SIGNIFICANT CHANGE UP (ref 0–0.7)
EOSINOPHIL NFR BLD AUTO: 2.6 % — SIGNIFICANT CHANGE UP (ref 0–8)
EOSINOPHIL NFR BLD AUTO: 2.8 % — SIGNIFICANT CHANGE UP (ref 0–8)
GLUCOSE SERPL-MCNC: 106 MG/DL — HIGH (ref 70–99)
GLUCOSE SERPL-MCNC: 112 MG/DL — HIGH (ref 70–99)
HCT VFR BLD CALC: 31 % — LOW (ref 37–47)
HCT VFR BLD CALC: 33.5 % — LOW (ref 37–47)
HGB BLD-MCNC: 10.1 G/DL — LOW (ref 12–16)
HGB BLD-MCNC: 11 G/DL — LOW (ref 12–16)
IMM GRANULOCYTES NFR BLD AUTO: 0.7 % — HIGH (ref 0.1–0.3)
IMM GRANULOCYTES NFR BLD AUTO: 0.9 % — HIGH (ref 0.1–0.3)
LYMPHOCYTES # BLD AUTO: 18.6 % — LOW (ref 20.5–51.1)
LYMPHOCYTES # BLD AUTO: 18.8 % — LOW (ref 20.5–51.1)
LYMPHOCYTES # BLD AUTO: 2.1 K/UL — SIGNIFICANT CHANGE UP (ref 1.2–3.4)
LYMPHOCYTES # BLD AUTO: 2.4 K/UL — SIGNIFICANT CHANGE UP (ref 1.2–3.4)
MAGNESIUM SERPL-MCNC: 1.6 MG/DL — LOW (ref 1.8–2.4)
MAGNESIUM SERPL-MCNC: 1.7 MG/DL — LOW (ref 1.8–2.4)
MCHC RBC-ENTMCNC: 29.4 PG — SIGNIFICANT CHANGE UP (ref 27–31)
MCHC RBC-ENTMCNC: 29.9 PG — SIGNIFICANT CHANGE UP (ref 27–31)
MCHC RBC-ENTMCNC: 32.6 G/DL — SIGNIFICANT CHANGE UP (ref 32–37)
MCHC RBC-ENTMCNC: 32.8 G/DL — SIGNIFICANT CHANGE UP (ref 32–37)
MCV RBC AUTO: 90.4 FL — SIGNIFICANT CHANGE UP (ref 81–99)
MCV RBC AUTO: 91 FL — SIGNIFICANT CHANGE UP (ref 81–99)
MONOCYTES # BLD AUTO: 0.99 K/UL — HIGH (ref 0.1–0.6)
MONOCYTES # BLD AUTO: 1.23 K/UL — HIGH (ref 0.1–0.6)
MONOCYTES NFR BLD AUTO: 8.8 % — SIGNIFICANT CHANGE UP (ref 1.7–9.3)
MONOCYTES NFR BLD AUTO: 9.6 % — HIGH (ref 1.7–9.3)
NEUTROPHILS # BLD AUTO: 7.77 K/UL — HIGH (ref 1.4–6.5)
NEUTROPHILS # BLD AUTO: 8.66 K/UL — HIGH (ref 1.4–6.5)
NEUTROPHILS NFR BLD AUTO: 67.7 % — SIGNIFICANT CHANGE UP (ref 42.2–75.2)
NEUTROPHILS NFR BLD AUTO: 68.9 % — SIGNIFICANT CHANGE UP (ref 42.2–75.2)
NRBC BLD AUTO-RTO: 0 /100 WBCS — SIGNIFICANT CHANGE UP (ref 0–0)
NRBC BLD AUTO-RTO: 0 /100 WBCS — SIGNIFICANT CHANGE UP (ref 0–0)
PHOSPHATE SERPL-MCNC: 3.3 MG/DL — SIGNIFICANT CHANGE UP (ref 2.1–4.9)
PHOSPHATE SERPL-MCNC: 3.6 MG/DL — SIGNIFICANT CHANGE UP (ref 2.1–4.9)
PLATELET # BLD AUTO: 457 K/UL — HIGH (ref 130–400)
PLATELET # BLD AUTO: 517 K/UL — HIGH (ref 130–400)
PMV BLD: 10.5 FL — HIGH (ref 7.4–10.4)
PMV BLD: 9.8 FL — SIGNIFICANT CHANGE UP (ref 7.4–10.4)
POTASSIUM SERPL-MCNC: 4.1 MMOL/L — SIGNIFICANT CHANGE UP (ref 3.5–5)
POTASSIUM SERPL-MCNC: 4.3 MMOL/L — SIGNIFICANT CHANGE UP (ref 3.5–5)
POTASSIUM SERPL-SCNC: 4.1 MMOL/L — SIGNIFICANT CHANGE UP (ref 3.5–5)
POTASSIUM SERPL-SCNC: 4.3 MMOL/L — SIGNIFICANT CHANGE UP (ref 3.5–5)
RBC # BLD: 3.43 M/UL — LOW (ref 4.2–5.4)
RBC # BLD: 3.68 M/UL — LOW (ref 4.2–5.4)
RBC # FLD: 13.3 % — SIGNIFICANT CHANGE UP (ref 11.5–14.5)
RBC # FLD: 13.4 % — SIGNIFICANT CHANGE UP (ref 11.5–14.5)
SODIUM SERPL-SCNC: 137 MMOL/L — SIGNIFICANT CHANGE UP (ref 135–146)
SODIUM SERPL-SCNC: 141 MMOL/L — SIGNIFICANT CHANGE UP (ref 135–146)
WBC # BLD: 11.27 K/UL — HIGH (ref 4.8–10.8)
WBC # BLD: 12.79 K/UL — HIGH (ref 4.8–10.8)
WBC # FLD AUTO: 11.27 K/UL — HIGH (ref 4.8–10.8)
WBC # FLD AUTO: 12.79 K/UL — HIGH (ref 4.8–10.8)

## 2025-03-02 PROCEDURE — 99232 SBSQ HOSP IP/OBS MODERATE 35: CPT

## 2025-03-02 RX ORDER — METOPROLOL SUCCINATE 50 MG/1
25 TABLET, EXTENDED RELEASE ORAL EVERY 12 HOURS
Refills: 0 | Status: DISCONTINUED | OUTPATIENT
Start: 2025-03-02 | End: 2025-03-04

## 2025-03-02 RX ORDER — MAGNESIUM SULFATE 500 MG/ML
2 SYRINGE (ML) INJECTION ONCE
Refills: 0 | Status: COMPLETED | OUTPATIENT
Start: 2025-03-02 | End: 2025-03-02

## 2025-03-02 RX ADMIN — Medication 25 GRAM(S): at 03:05

## 2025-03-02 RX ADMIN — Medication 650 MILLIGRAM(S): at 11:40

## 2025-03-02 RX ADMIN — Medication 25 GRAM(S): at 10:42

## 2025-03-02 RX ADMIN — Medication 25 GRAM(S): at 08:02

## 2025-03-02 RX ADMIN — Medication 25 GRAM(S): at 18:43

## 2025-03-02 RX ADMIN — Medication 650 MILLIGRAM(S): at 17:16

## 2025-03-02 RX ADMIN — METOPROLOL SUCCINATE 25 MILLIGRAM(S): 50 TABLET, EXTENDED RELEASE ORAL at 17:17

## 2025-03-02 RX ADMIN — Medication 650 MILLIGRAM(S): at 08:02

## 2025-03-02 RX ADMIN — METOPROLOL SUCCINATE 25 MILLIGRAM(S): 50 TABLET, EXTENDED RELEASE ORAL at 05:07

## 2025-03-02 RX ADMIN — ENOXAPARIN SODIUM 40 MILLIGRAM(S): 100 INJECTION SUBCUTANEOUS at 17:17

## 2025-03-02 RX ADMIN — POLYETHYLENE GLYCOL 3350 17 GRAM(S): 17 POWDER, FOR SOLUTION ORAL at 11:40

## 2025-03-02 RX ADMIN — Medication 40 MILLIGRAM(S): at 05:07

## 2025-03-02 NOTE — PROGRESS NOTE ADULT - SUBJECTIVE AND OBJECTIVE BOX
GENERAL SURGERY PROGRESS NOTE    Patient: JOSE DE JESUS MYLES , 70y ([birth year])Female   MRN: [id number]  Location: [address]  Visit: 02-23-25 Inpatient  Date: 03-02-25 @ 02:38    24 hours: the patient’s ostomy was functioning with evidence of gas passage but no bowel movements noted. Antibiotics were continued over the weekend as per Dr. Aviles’s recommendation, and metoprolol 25?mg was administered twice daily.     PHYSICAL EXAM:  GENERAL: NAD  CHEST/LUNG: equal chest rise b/l  HEART: Regular rate and rhythm  ABDOMEN: Soft, minimally tender, Nondistended; Ostomy pink and well perfused with bowel sweat in appliance  EXTREMITIES:  No clubbing, cyanosis, or edema    PAST MEDICAL & SURGICAL HISTORY:  Breast cancer      Diverticulitis      History of hysteroscopy      H/O: hysterectomy          Vitals:   T(F): 98 (03-01-25 @ 23:47), Max: 98.1 (03-01-25 @ 08:58)  HR: 108 (03-01-25 @ 23:47)  BP: 142/87 (03-01-25 @ 23:47)  RR: 18 (03-01-25 @ 23:47)  SpO2: 98% (03-01-25 @ 23:47)      Diet, Full Liquid:   No Carbonated Beverages      Fluids:     I & O's:    02-28-25 @ 07:01  -  03-01-25 @ 07:00  --------------------------------------------------------  IN:  Total IN: 0 mL    OUT:    Colostomy (mL): 0 mL    Drain (mL): 260 mL  Total OUT: 260 mL    Total NET: -260 mL    MEDICATIONS  (STANDING):  acetaminophen     Tablet .. 650 milliGRAM(s) Oral every 6 hours  enoxaparin Injectable 40 milliGRAM(s) SubCutaneous every 24 hours  metoprolol succinate ER 25 milliGRAM(s) Oral every 12 hours  pantoprazole    Tablet 40 milliGRAM(s) Oral before breakfast  piperacillin/tazobactam IVPB.. 3.375 Gram(s) IV Intermittent every 8 hours  polyethylene glycol 3350 17 Gram(s) Oral daily    MEDICATIONS  (PRN):  ketorolac   Injectable 15 milliGRAM(s) IV Push every 8 hours PRN Moderate Pain (4 - 6)  ondansetron Injectable 4 milliGRAM(s) IV Push every 6 hours PRN Nausea  oxyCODONE    IR 5 milliGRAM(s) Oral every 6 hours PRN Severe Pain (7 - 10)      DVT PROPHYLAXIS: enoxaparin Injectable 40 milliGRAM(s) SubCutaneous every 24 hours    GI PROPHYLAXIS: pantoprazole    Tablet 40 milliGRAM(s) Oral before breakfast    ANTICOAGULATION:   ANTIBIOTICS:  piperacillin/tazobactam IVPB.. 3.375 Gram(s)    LAB/STUDIES:  Labs:  CAPILLARY BLOOD GLUCOSE        ASSESSMENT:  70y y/o  Female  s/p exploratory laparotomy with raissa's procedure    PLAN:   - Monitor ostomy output quality and quantity   - Continue Miralax  - Monitor NORY drain quality and quality   - Continue zosyn over the weekned per piotr.   - DVT and GI ppx  - Daily labs, replete electrolytes as needed   - Multi modal pain control   - Ostomy RN recs appreciated   - Encourage ambulation and IS as tolerated   - F/u CM/SW for dispo planning     TRAUMA SURGERY SPECTRA: [id number]

## 2025-03-02 NOTE — PROGRESS NOTE ADULT - SUBJECTIVE AND OBJECTIVE BOX
JOSE DE JESUS MYLES  70y, Female  Allergy: adhesives (Other)  No Known Drug Allergies    Hospital Day: 7d    Patient seen and examined earlier today.  No acute events overnight.     PMH/PSH:  PAST MEDICAL & SURGICAL HISTORY:  Breast cancer      Diverticulitis      History of hysteroscopy      H/O: hysterectomy          LAST 24-Hr EVENTS:    VITALS:  T(F): 98.3 (03-02-25 @ 09:59), Max: 98.5 (03-02-25 @ 05:13)  HR: 98 (03-02-25 @ 09:59)  BP: 144/75 (03-02-25 @ 09:59) (136/88 - 159/76)  RR: 18 (03-02-25 @ 09:59)  SpO2: 98% (03-02-25 @ 09:59)          TESTS & MEASUREMENTS:  Weight/BMI      02-28-25 @ 07:01  -  03-01-25 @ 07:00  --------------------------------------------------------  IN: 0 mL / OUT: 260 mL / NET: -260 mL    03-01-25 @ 07:01  -  03-02-25 @ 07:00  --------------------------------------------------------  IN: 0 mL / OUT: 130 mL / NET: -130 mL                            11.0   11.27 )-----------( 517      ( 02 Mar 2025 07:55 )             33.5         03-02    141  |  102  |  4[L]  ----------------------------<  106[H]  4.3   |  27  |  0.7    Ca    8.9      02 Mar 2025 07:55  Phos  3.3     03-02  Mg     1.7     03-02              Urinalysis Basic - ( 02 Mar 2025 07:55 )    Color: x / Appearance: x / SG: x / pH: x  Gluc: 106 mg/dL / Ketone: x  / Bili: x / Urobili: x   Blood: x / Protein: x / Nitrite: x   Leuk Esterase: x / RBC: x / WBC x   Sq Epi: x / Non Sq Epi: x / Bacteria: x                            RADIOLOGY, ECG, & ADDITIONAL TESTS:  12 Lead ECG:   Ventricular Rate 107 BPM    Atrial Rate 107 BPM    P-R Interval 118 ms    QRS Duration 76 ms    Q-T Interval 342 ms    QTC Calculation(Bazett) 456 ms    P Axis 39 degrees    R Axis 0 degrees    T Axis 30 degrees    Diagnosis Line Sinus tachycardia  Minimal voltage criteria for LVH, may be normal variant ( R in aVL )  Possible Anterior infarct , age undetermined  Abnormal ECG    Confirmed by Rayshawn Marcus ([id number]) on 2/27/2025 9:19:30 AM (02-25-25 @ 22:47)      RECENT DIAGNOSTIC ORDERS:  Diet, Regular:   Fiber/Residue Restricted (LOWFIBER) (03-02-25 @ 10:10)  A1C with Estimated Average Glucose: AM Sched. Collection: 02-Mar-2025 04:30 (03-02-25 @ 03:28)      MEDICATIONS:  MEDICATIONS  (STANDING):  acetaminophen     Tablet .. 650 milliGRAM(s) Oral every 6 hours  enoxaparin Injectable 40 milliGRAM(s) SubCutaneous every 24 hours  metoprolol succinate ER 25 milliGRAM(s) Oral every 12 hours  pantoprazole    Tablet 40 milliGRAM(s) Oral before breakfast  piperacillin/tazobactam IVPB.. 3.375 Gram(s) IV Intermittent every 8 hours  polyethylene glycol 3350 17 Gram(s) Oral daily    MEDICATIONS  (PRN):  ondansetron Injectable 4 milliGRAM(s) IV Push every 6 hours PRN Nausea  oxyCODONE    IR 5 milliGRAM(s) Oral every 6 hours PRN Severe Pain (7 - 10)      HOME MEDICATIONS:  multivitamin (02-23)      PHYSICAL EXAM:  GENERAL: NAD  HEAD:  Atraumatic, Normocephalic  EYES: conjunctiva and sclera clear  NECK: Supple  CHEST/LUNG: Non labored respirations  HEART: regular rate and rhythm   ABDOMEN: Soft, Nondistended, +BS, + colostomy   EXTREMITIES:  No clubbing, cyanosis, or edema  PSYCH: AAOx3  NEUROLOGY: non-focal  SKIN: No rashes or lesions

## 2025-03-03 LAB
A1C WITH ESTIMATED AVERAGE GLUCOSE RESULT: 6.1 % — HIGH (ref 4–5.6)
BASOPHILS # BLD AUTO: 0.04 K/UL — SIGNIFICANT CHANGE UP (ref 0–0.2)
BASOPHILS NFR BLD AUTO: 0.3 % — SIGNIFICANT CHANGE UP (ref 0–1)
EOSINOPHIL # BLD AUTO: 0.27 K/UL — SIGNIFICANT CHANGE UP (ref 0–0.7)
EOSINOPHIL NFR BLD AUTO: 2.3 % — SIGNIFICANT CHANGE UP (ref 0–8)
ESTIMATED AVERAGE GLUCOSE: 128 MG/DL — HIGH (ref 68–114)
HCT VFR BLD CALC: 31.5 % — LOW (ref 37–47)
HGB BLD-MCNC: 10.2 G/DL — LOW (ref 12–16)
IMM GRANULOCYTES NFR BLD AUTO: 1 % — HIGH (ref 0.1–0.3)
LYMPHOCYTES # BLD AUTO: 19.3 % — LOW (ref 20.5–51.1)
LYMPHOCYTES # BLD AUTO: 2.24 K/UL — SIGNIFICANT CHANGE UP (ref 1.2–3.4)
MCHC RBC-ENTMCNC: 29.7 PG — SIGNIFICANT CHANGE UP (ref 27–31)
MCHC RBC-ENTMCNC: 32.4 G/DL — SIGNIFICANT CHANGE UP (ref 32–37)
MCV RBC AUTO: 91.6 FL — SIGNIFICANT CHANGE UP (ref 81–99)
MONOCYTES # BLD AUTO: 1.31 K/UL — HIGH (ref 0.1–0.6)
MONOCYTES NFR BLD AUTO: 11.3 % — HIGH (ref 1.7–9.3)
NEUTROPHILS # BLD AUTO: 7.65 K/UL — HIGH (ref 1.4–6.5)
NEUTROPHILS NFR BLD AUTO: 65.8 % — SIGNIFICANT CHANGE UP (ref 42.2–75.2)
NRBC BLD AUTO-RTO: 0 /100 WBCS — SIGNIFICANT CHANGE UP (ref 0–0)
PLATELET # BLD AUTO: 527 K/UL — HIGH (ref 130–400)
PMV BLD: 10.1 FL — SIGNIFICANT CHANGE UP (ref 7.4–10.4)
RBC # BLD: 3.44 M/UL — LOW (ref 4.2–5.4)
RBC # FLD: 13.8 % — SIGNIFICANT CHANGE UP (ref 11.5–14.5)
WBC # BLD: 11.63 K/UL — HIGH (ref 4.8–10.8)
WBC # FLD AUTO: 11.63 K/UL — HIGH (ref 4.8–10.8)

## 2025-03-03 PROCEDURE — 99024 POSTOP FOLLOW-UP VISIT: CPT

## 2025-03-03 PROCEDURE — 99232 SBSQ HOSP IP/OBS MODERATE 35: CPT

## 2025-03-03 RX ADMIN — Medication 650 MILLIGRAM(S): at 12:15

## 2025-03-03 RX ADMIN — Medication 40 MILLIGRAM(S): at 05:36

## 2025-03-03 RX ADMIN — ENOXAPARIN SODIUM 40 MILLIGRAM(S): 100 INJECTION SUBCUTANEOUS at 17:50

## 2025-03-03 RX ADMIN — METOPROLOL SUCCINATE 25 MILLIGRAM(S): 50 TABLET, EXTENDED RELEASE ORAL at 17:50

## 2025-03-03 RX ADMIN — Medication 650 MILLIGRAM(S): at 11:44

## 2025-03-03 RX ADMIN — Medication 650 MILLIGRAM(S): at 23:48

## 2025-03-03 RX ADMIN — Medication 650 MILLIGRAM(S): at 23:18

## 2025-03-03 RX ADMIN — Medication 650 MILLIGRAM(S): at 17:50

## 2025-03-03 RX ADMIN — Medication 1 APPLICATION(S): at 11:49

## 2025-03-03 RX ADMIN — POLYETHYLENE GLYCOL 3350 17 GRAM(S): 17 POWDER, FOR SOLUTION ORAL at 11:45

## 2025-03-03 RX ADMIN — METOPROLOL SUCCINATE 25 MILLIGRAM(S): 50 TABLET, EXTENDED RELEASE ORAL at 05:36

## 2025-03-03 RX ADMIN — Medication 25 GRAM(S): at 03:52

## 2025-03-03 NOTE — PROGRESS NOTE ADULT - SUBJECTIVE AND OBJECTIVE BOX
GENERAL SURGERY PROGRESS NOTE    Patient: JOSE DE JESUS MYLES , 70y ([birth year])Female   MRN: [id number]  Location: [address]  Visit: 02-23-25 Inpatient  Date: 03-03-25 @ 02:04    Hospital Day #:  Post-Op Day #:    Procedure/Dx/Injuries:    Events of past 24 hours: patient seen and examined at bedside. No acute events overnight. Advanced to low fiber diet, tolerating. Gas and bowel sweat in ostomy.     PAST MEDICAL & SURGICAL HISTORY:  Breast cancer      Diverticulitis      History of hysteroscopy      H/O: hysterectomy          Vitals:   T(F): 98.2 (03-02-25 @ 23:47), Max: 98.5 (03-02-25 @ 05:13)  HR: 91 (03-02-25 @ 23:47)  BP: 138/77 (03-02-25 @ 23:47)  RR: 18 (03-02-25 @ 23:47)  SpO2: 98% (03-02-25 @ 23:47)      Diet, Regular:   Fiber/Residue Restricted (LOWFIBER)      Fluids:     I & O's:    03-01-25 @ 07:01  -  03-02-25 @ 07:00  --------------------------------------------------------  IN:  Total IN: 0 mL    OUT:    Colostomy (mL): 0 mL    Drain (mL): 130 mL  Total OUT: 130 mL    Total NET: -130 mL    PHYSICAL EXAM:  GENERAL: NAD  CHEST/LUNG: equal chest rise b/l  HEART: Regular rate and rhythm  ABDOMEN: Soft, minimally tender, Nondistended; Ostomy pink and well perfused with bowel sweat in appliance  EXTREMITIES:  No clubbing, cyanosis, or edema    MEDICATIONS  (STANDING):  acetaminophen     Tablet .. 650 milliGRAM(s) Oral every 6 hours  enoxaparin Injectable 40 milliGRAM(s) SubCutaneous every 24 hours  metoprolol tartrate 25 milliGRAM(s) Oral every 12 hours  pantoprazole    Tablet 40 milliGRAM(s) Oral before breakfast  piperacillin/tazobactam IVPB.. 3.375 Gram(s) IV Intermittent every 8 hours  polyethylene glycol 3350 17 Gram(s) Oral daily    MEDICATIONS  (PRN):  ondansetron Injectable 4 milliGRAM(s) IV Push every 6 hours PRN Nausea  oxyCODONE    IR 5 milliGRAM(s) Oral every 6 hours PRN Severe Pain (7 - 10)      DVT PROPHYLAXIS: enoxaparin Injectable 40 milliGRAM(s) SubCutaneous every 24 hours    GI PROPHYLAXIS: pantoprazole    Tablet 40 milliGRAM(s) Oral before breakfast    ANTIBIOTICS:  piperacillin/tazobactam IVPB.. 3.375 Gram(s)      LAB/STUDIES:  Labs:  CAPILLARY BLOOD GLUCOSE                              11.0   11.27 )-----------( 517      ( 02 Mar 2025 07:55 )             33.5       Auto Immature Granulocyte %: 0.7 % (03-02-25 @ 07:55)  Auto Immature Granulocyte %: 0.9 % (03-02-25 @ 03:23)    03-02    141  |  102  |  4[L]  ----------------------------<  106[H]  4.3   |  27  |  0.7      Calcium: 8.9 mg/dL (03-02-25 @ 07:55)

## 2025-03-03 NOTE — PROGRESS NOTE ADULT - SUBJECTIVE AND OBJECTIVE BOX
JOSE DE JESUS MYLES  70y, Female  Allergy: adhesives (Other)  No Known Drug Allergies      LOS  8d    CHIEF COMPLAINT: contained perforated sigmoid diverticulitis (03 Mar 2025 02:04)      INTERVAL EVENTS/HPI  - No acute events overnight  - T(F): , Max: 98.4 (03-02-25 @ 20:30)  - denies worsening abdominal pain -- awaiting output from ostomy   - WBC Count: 11.27 (03-02-25 @ 07:55)  WBC Count: 12.79 (03-02-25 @ 03:23)     - Creatinine: 0.7 (03-02-25 @ 07:55)  Creatinine: 0.6 (03-02-25 @ 03:23)       ROS  General: Denies rigors, nightsweats  HEENT: Denies headache, rhinorrhea, sore throat, eye pain  CV: Denies CP, palpitations  PULM: Denies wheezing, hemoptysis  GI: Denies hematemesis, hematochezia, melena  : Denies discharge, hematuria  MSK: Denies arthralgias, myalgias  SKIN: Denies rash, lesions  NEURO: Denies paresthesias, weakness  PSYCH: Denies depression, anxiety    VITALS:  T(F): 97.4, Max: 98.4 (03-02-25 @ 20:30)  HR: 98  BP: 113/78  RR: 18Vital Signs Last 24 Hrs  T(C): 36.3 (03 Mar 2025 11:20), Max: 36.9 (02 Mar 2025 20:30)  T(F): 97.4 (03 Mar 2025 11:20), Max: 98.4 (02 Mar 2025 20:30)  HR: 98 (03 Mar 2025 11:20) (91 - 99)  BP: 113/78 (03 Mar 2025 11:20) (113/78 - 143/84)  BP(mean): 90 (03 Mar 2025 11:20) (90 - 99)  RR: 18 (03 Mar 2025 11:20) (18 - 18)  SpO2: 96% (03 Mar 2025 11:20) (96% - 99%)    Parameters below as of 03 Mar 2025 11:20  Patient On (Oxygen Delivery Method): room air        PHYSICAL EXAM:  Gen: NAD, resting in bed  HEENT: Normocephalic, atraumatic  Neck: supple, no lymphadenopathy  CV: Regular rate & regular rhythm  Lungs: decreased BS at bases, no fremitus  Abdomen: Soft, BS present  Ext: Warm, well perfused  Neuro: non focal, awake  Skin: no rash, no erythema  Lines: no phlebitis    FH: Non-contributory  Social Hx: Non-contributory    TESTS & MEASUREMENTS:                        11.0   11.27 )-----------( 517      ( 02 Mar 2025 07:55 )             33.5     03-02    141  |  102  |  4[L]  ----------------------------<  106[H]  4.3   |  27  |  0.7    Ca    8.9      02 Mar 2025 07:55  Phos  3.3     03-02  Mg     1.7     03-02          Urinalysis Basic - ( 02 Mar 2025 07:55 )    Color: x / Appearance: x / SG: x / pH: x  Gluc: 106 mg/dL / Ketone: x  / Bili: x / Urobili: x   Blood: x / Protein: x / Nitrite: x   Leuk Esterase: x / RBC: x / WBC x   Sq Epi: x / Non Sq Epi: x / Bacteria: x        Urinalysis with Rflx Culture (collected 02-23-25 @ 09:25)            INFECTIOUS DISEASES TESTING      INFLAMMATORY MARKERS      RADIOLOGY & ADDITIONAL TESTS:  I have personally reviewed the last available Chest xray  CXR      CT      CARDIOLOGY TESTING  12 Lead ECG:   Ventricular Rate 107 BPM    Atrial Rate 107 BPM    P-R Interval 118 ms    QRS Duration 76 ms    Q-T Interval 342 ms    QTC Calculation(Bazett) 456 ms    P Axis 39 degrees    R Axis 0 degrees    T Axis 30 degrees    Diagnosis Line Sinus tachycardia  Minimal voltage criteria for LVH, may be normal variant ( R in aVL )  Possible Anterior infarct , age undetermined  Abnormal ECG    Confirmed by Rayshawn Marcus ([id number]) on 2/27/2025 9:19:30 AM (02-25-25 @ 22:47)  12 Lead ECG:   Ventricular Rate 111 BPM    Atrial Rate 111 BPM    P-R Interval 132 ms    QRS Duration 80 ms    Q-T Interval 330 ms    QTC Calculation(Bazett) 448 ms    P Axis 44 degrees    R Axis 6 degrees    T Axis 43 degrees    Diagnosis Line Sinus tachycardia  Minimal voltage criteria for LVH, may be normal variant ( R in aVL )  Borderline ECG    Confirmed by Vaughn Jack ([id number]) on 2/25/2025 6:43:00 PM (02-25-25 @ 00:53)      MEDICATIONS  acetaminophen     Tablet .. 650 Oral every 6 hours  chlorhexidine 2% Cloths 1 Topical <User Schedule>  enoxaparin Injectable 40 SubCutaneous every 24 hours  metoprolol tartrate 25 Oral every 12 hours  pantoprazole    Tablet 40 Oral before breakfast  polyethylene glycol 3350 17 Oral daily      WEIGHT  Weight (kg): 75.3 (02-25-25 @ 11:04)      ANTIBIOTICS:      All available historical records have been reviewed

## 2025-03-03 NOTE — PROGRESS NOTE ADULT - SUBJECTIVE AND OBJECTIVE BOX
T H I S   I S    N O  T   A    F I N A L I Z E D   N O T JOSE DE JESUS DURAND  70y, Female  Allergy: adhesives (Other)  No Known Drug Allergies    Hospital Day: 8d    Patient seen and examined earlier today.     PMH/PSH:  PAST MEDICAL & SURGICAL HISTORY:  Breast cancer      Diverticulitis      History of hysteroscopy      H/O: hysterectomy          LAST 24-Hr EVENTS:    VITALS:  T(F): 97.4 (03-03-25 @ 11:20), Max: 98.4 (03-02-25 @ 20:30)  HR: 98 (03-03-25 @ 11:20)  BP: 113/78 (03-03-25 @ 11:20) (113/78 - 143/84)  RR: 18 (03-03-25 @ 11:20)  SpO2: 96% (03-03-25 @ 11:20)          TESTS & MEASUREMENTS:  Weight/BMI      03-01-25 @ 07:01  -  03-02-25 @ 07:00  --------------------------------------------------------  IN: 0 mL / OUT: 130 mL / NET: -130 mL    03-02-25 @ 07:01  -  03-03-25 @ 07:00  --------------------------------------------------------  IN: 0 mL / OUT: 110 mL / NET: -110 mL    03-03-25 @ 07:01  -  03-03-25 @ 16:13  --------------------------------------------------------  IN: 0 mL / OUT: 50 mL / NET: -50 mL                            11.0   11.27 )-----------( 517      ( 02 Mar 2025 07:55 )             33.5         03-02    141  |  102  |  4[L]  ----------------------------<  106[H]  4.3   |  27  |  0.7    Ca    8.9      02 Mar 2025 07:55  Phos  3.3     03-02  Mg     1.7     03-02              Urinalysis Basic - ( 02 Mar 2025 07:55 )    Color: x / Appearance: x / SG: x / pH: x  Gluc: 106 mg/dL / Ketone: x  / Bili: x / Urobili: x   Blood: x / Protein: x / Nitrite: x   Leuk Esterase: x / RBC: x / WBC x   Sq Epi: x / Non Sq Epi: x / Bacteria: x                    A1C with Estimated Average Glucose Result: 6.1 % (03-02-25 @ 07:55)          RADIOLOGY, ECG, & ADDITIONAL TESTS:  12 Lead ECG:   Ventricular Rate 107 BPM    Atrial Rate 107 BPM    P-R Interval 118 ms    QRS Duration 76 ms    Q-T Interval 342 ms    QTC Calculation(Bazett) 456 ms    P Axis 39 degrees    R Axis 0 degrees    T Axis 30 degrees    Diagnosis Line Sinus tachycardia  Minimal voltage criteria for LVH, may be normal variant ( R in aVL )  Possible Anterior infarct , age undetermined  Abnormal ECG    Confirmed by Rayshawn Marcus ([id number]) on 2/27/2025 9:19:30 AM (02-25-25 @ 22:47)      RECENT DIAGNOSTIC ORDERS:  Phosphorus: 20:00 (03-03-25 @ 12:20)  Magnesium: 20:00 (03-03-25 @ 12:20)  Basic Metabolic Panel: 20:00 (03-03-25 @ 12:20)  Complete Blood Count + Automated Diff: 20:00 (03-03-25 @ 07:16)      MEDICATIONS:  MEDICATIONS  (STANDING):  acetaminophen     Tablet .. 650 milliGRAM(s) Oral every 6 hours  chlorhexidine 2% Cloths 1 Application(s) Topical <User Schedule>  enoxaparin Injectable 40 milliGRAM(s) SubCutaneous every 24 hours  metoprolol tartrate 25 milliGRAM(s) Oral every 12 hours  pantoprazole    Tablet 40 milliGRAM(s) Oral before breakfast  polyethylene glycol 3350 17 Gram(s) Oral daily    MEDICATIONS  (PRN):  ondansetron Injectable 4 milliGRAM(s) IV Push every 6 hours PRN Nausea  oxyCODONE    IR 5 milliGRAM(s) Oral every 6 hours PRN Severe Pain (7 - 10)      HOME MEDICATIONS:  multivitamin (02-23)      PHYSICAL EXAM:  GENERAL: NAD  HEAD:  Atraumatic, Normocephalic  EYES: conjunctiva and sclera clear  NECK: Supple  CHEST/LUNG: Non labored respirations  HEART: regular rate and rhythm   ABDOMEN: Soft, Nondistended, +BS, + colostomy   EXTREMITIES:  No clubbing, cyanosis, or edema  PSYCH: AAOx3  NEUROLOGY: non-focal  SKIN: No rashes or lesions

## 2025-03-04 ENCOUNTER — TRANSCRIPTION ENCOUNTER (OUTPATIENT)
Age: 70
End: 2025-03-04

## 2025-03-04 VITALS
RESPIRATION RATE: 18 BRPM | TEMPERATURE: 98 F | OXYGEN SATURATION: 98 % | DIASTOLIC BLOOD PRESSURE: 85 MMHG | HEART RATE: 88 BPM | SYSTOLIC BLOOD PRESSURE: 129 MMHG

## 2025-03-04 PROBLEM — K57.92 DIVERTICULITIS OF INTESTINE, PART UNSPECIFIED, WITHOUT PERFORATION OR ABSCESS WITHOUT BLEEDING: Chronic | Status: ACTIVE | Noted: 2025-02-23

## 2025-03-04 LAB
ANION GAP SERPL CALC-SCNC: 13 MMOL/L — SIGNIFICANT CHANGE UP (ref 7–14)
BUN SERPL-MCNC: 9 MG/DL — LOW (ref 10–20)
CALCIUM SERPL-MCNC: 9 MG/DL — SIGNIFICANT CHANGE UP (ref 8.4–10.5)
CHLORIDE SERPL-SCNC: 102 MMOL/L — SIGNIFICANT CHANGE UP (ref 98–110)
CO2 SERPL-SCNC: 23 MMOL/L — SIGNIFICANT CHANGE UP (ref 17–32)
CREAT SERPL-MCNC: 0.7 MG/DL — SIGNIFICANT CHANGE UP (ref 0.7–1.5)
EGFR: 92 ML/MIN/1.73M2 — SIGNIFICANT CHANGE UP
EGFR: 92 ML/MIN/1.73M2 — SIGNIFICANT CHANGE UP
GLUCOSE SERPL-MCNC: 112 MG/DL — HIGH (ref 70–99)
MAGNESIUM SERPL-MCNC: 1.8 MG/DL — SIGNIFICANT CHANGE UP (ref 1.8–2.4)
PHOSPHATE SERPL-MCNC: 3.2 MG/DL — SIGNIFICANT CHANGE UP (ref 2.1–4.9)
POTASSIUM SERPL-MCNC: 4 MMOL/L — SIGNIFICANT CHANGE UP (ref 3.5–5)
POTASSIUM SERPL-SCNC: 4 MMOL/L — SIGNIFICANT CHANGE UP (ref 3.5–5)
SODIUM SERPL-SCNC: 138 MMOL/L — SIGNIFICANT CHANGE UP (ref 135–146)

## 2025-03-04 PROCEDURE — 99024 POSTOP FOLLOW-UP VISIT: CPT

## 2025-03-04 RX ORDER — METOPROLOL SUCCINATE 50 MG/1
1 TABLET, EXTENDED RELEASE ORAL
Qty: 60 | Refills: 0
Start: 2025-03-04 | End: 2025-04-02

## 2025-03-04 RX ORDER — MAGNESIUM SULFATE 500 MG/ML
2 SYRINGE (ML) INJECTION ONCE
Refills: 0 | Status: COMPLETED | OUTPATIENT
Start: 2025-03-04 | End: 2025-03-04

## 2025-03-04 RX ORDER — AMOXICILLIN AND CLAVULANATE POTASSIUM 500; 125 MG/1; MG/1
1 TABLET, FILM COATED ORAL
Qty: 10 | Refills: 0
Start: 2025-03-04 | End: 2025-03-08

## 2025-03-04 RX ADMIN — Medication 650 MILLIGRAM(S): at 05:35

## 2025-03-04 RX ADMIN — Medication 1 APPLICATION(S): at 05:42

## 2025-03-04 RX ADMIN — POLYETHYLENE GLYCOL 3350 17 GRAM(S): 17 POWDER, FOR SOLUTION ORAL at 12:00

## 2025-03-04 RX ADMIN — Medication 40 MILLIGRAM(S): at 05:36

## 2025-03-04 RX ADMIN — Medication 650 MILLIGRAM(S): at 06:05

## 2025-03-04 RX ADMIN — Medication 650 MILLIGRAM(S): at 12:00

## 2025-03-04 RX ADMIN — METOPROLOL SUCCINATE 25 MILLIGRAM(S): 50 TABLET, EXTENDED RELEASE ORAL at 05:36

## 2025-03-04 RX ADMIN — Medication 25 GRAM(S): at 05:38

## 2025-03-04 NOTE — DISCHARGE NOTE PROVIDER - NSDCFUADDAPPT_GEN_ALL_CORE_FT
Please follow up with your primary care provider or [facility] regarding your elevated hgb A1C and elevated heart rate throughout your hospital admission.

## 2025-03-04 NOTE — DISCHARGE NOTE PROVIDER - NSDCCPTREATMENT_GEN_ALL_CORE_FT
PRINCIPAL PROCEDURE  Procedure: Exploratory laparotomy with Caroline procedure  Findings and Treatment: ostomy created

## 2025-03-04 NOTE — PROGRESS NOTE ADULT - ATTENDING COMMENTS
Patient has WBC 24 today.  Has more pain in the lower abdomen  Schedule for OR today.
70 F with diverticulitis s/p Aragon procedure on 2/25/25.     Visited pt at bedside this AM. Pt's partner at bedside during visit. Reports feeling well. Denies any abdominal pain. Denies any nausea/vomiting. Denies any fever/chills. Tolerating her clear liquid diet.     PE:  General; female, in bed, comfortable, in NAD  Head; NC/AT  Heart; RRR  Lungs; even chest rise  Abdomen; soft, non-tender, non-distended, midline wound clean and dry, staples in places, ostomy pink, patent, no stool appreciated, some mucus and gas appreciated    A/P:  70 F with diverticulitis s/p Aragon procedure.    - Continue abx  - Maintain on CLD  - PT/OT  - DVT PPx  - Multi-modal analgesia   - Out of bed, incentive spirometry use
70 F with diverticulitis s/p Aragon procedure on 2/25/25.     Visited pt at bedside this AM. Reports feeling well. Denies any nausea/vomiting. Tolerating her regular diet and ambulating well. Reports having ostomy stool function overnight. Denies any chest pain or SOB.   PE:  General; female, in bed, comfortable, in NAD  Head; NC/AT  Heart; RRR  Lungs; even chest rise  Abdomen; soft, non-tender, non-distended, midline wound clean and dry, staples in places, ostomy pink, with liquid stool in bag, drain in place, serous output in bulb     A/P:  70 F with diverticulitis s/p Aragon procedure.    - D/C NORY drain (85 cc serous output)  - D/C planning   - PT/OT  - DVT PPx  - Multi-modal analgesia   - Out of bed, incentive spirometry use
POD 2    Start clear liquid diet.  Encourage incentive spirometer  DVT proph  D/C PCA and put pain medications as needed  PT f/u needed
POD 3  Advance diet as tolerated.  Colostomy care  Keep on Zosyn  DVT prophylaxis
Patient has LLQ abdominal pain.  I independently reviewed all images and labs:  CT Abd/Pelvis - shows sigmoid diverticulitis with perforation and abscess formation.  WBC 13    PE:  AAO x3  Chest: clear.  CV : sinus tach  Abdomen: tender in the LLQ with localized peritonitis.    ASSESSMENT:  69 y/o female with Sigmoid Diverticulitis with Perforation and Abscess formation.  Localized peritonitis.  Abdominal pain.    PLAN:  - npo, ivf  - follow serum electrolytes and UOP  - on Zosyn  - DVT proph    Will book for OR for Exploratory Laparotomy, Possible Bowel Resection, Possible Ostomy.    Informed consent was obtained from the patient for the above procedure after explaining all the risks and benefits of it including but not limited to infection, bleeding and etc. She understood and agreed. All questions were answered.
Patient seen and examined with surgery team on rounds and discussed management plans with patient.  5 days POP Caroline with colostomy. colostomy viable pink with some function on liquids Diet advanced abd soft benign otherwise. Good urine output. Ambulating well. needs further colostomy care and teaching prior to discharge. Needs appliance change.
70 F with diverticulitis s/p Aragon procedure on 2/25/25.     Visited pt at bedside this AM. Reports feeling well. Denies any nausea/vomiting. Denies any fever/chills. Denies any chest pain or SOB. Reports tolerated her breakfast this morning.   PE:  General; female, in bed, comfortable, in NAD  Head; NC/AT  Heart; RRR  Lungs; even chest rise  Abdomen; soft, non-tender, non-distended, midline wound clean and dry, staples in places, ostomy pink, patent, drain in place, serous output in bulb     A/P:  70 F with diverticulitis s/p Aragon procedure.    - Advanced to regular diet over the weekend   - PT/OT  - DVT PPx  - Multi-modal analgesia   - Out of bed, incentive spirometry use   - Maintain abdominal drain at this time  - D/C planning

## 2025-03-04 NOTE — PROGRESS NOTE ADULT - PROVIDER SPECIALTY LIST ADULT
Infectious Disease
Surgery
Infectious Disease
Infectious Disease
Surgery
Infectious Disease
Internal Medicine
Surgery
Surgery
Infectious Disease
Internal Medicine

## 2025-03-04 NOTE — PROGRESS NOTE ADULT - SUBJECTIVE AND OBJECTIVE BOX
JOSE DE JESUS MYLES  70y, Female  Allergy: adhesives (Other)  No Known Drug Allergies      LOS  9d    CHIEF COMPLAINT: contained perforated sigmoid diverticulitis (04 Mar 2025 01:59)      INTERVAL EVENTS/HPI  - No acute events overnight  - T(F): , Max: 98.8 (03-03-25 @ 17:00)  - noted output from colostomy -- denies abdominal pain   - WBC Count: 11.63 (03-03-25 @ 23:10)  WBC Count: 11.27 (03-02-25 @ 07:55)     - Creatinine: 0.7 (03-03-25 @ 23:10)       ROS  General: Denies rigors, nightsweats  HEENT: Denies headache, rhinorrhea, sore throat, eye pain  CV: Denies CP, palpitations  PULM: Denies wheezing, hemoptysis  GI: Denies hematemesis, hematochezia, melena  : Denies discharge, hematuria  MSK: Denies arthralgias, myalgias  SKIN: Denies rash, lesions  NEURO: Denies paresthesias, weakness  PSYCH: Denies depression, anxiety    VITALS:  T(F): 98.1, Max: 98.8 (03-03-25 @ 17:00)  HR: 91  BP: 113/64  RR: 18Vital Signs Last 24 Hrs  T(C): 36.7 (04 Mar 2025 09:03), Max: 37.1 (03 Mar 2025 17:00)  T(F): 98.1 (04 Mar 2025 09:03), Max: 98.8 (03 Mar 2025 17:00)  HR: 91 (04 Mar 2025 09:03) (91 - 111)  BP: 113/64 (04 Mar 2025 09:03) (113/64 - 134/88)  BP(mean): 90 (03 Mar 2025 11:20) (90 - 90)  RR: 18 (04 Mar 2025 09:03) (18 - 18)  SpO2: 96% (04 Mar 2025 09:03) (96% - 98%)    Parameters below as of 04 Mar 2025 09:03  Patient On (Oxygen Delivery Method): room air        PHYSICAL EXAM:  Gen: NAD, resting in bed  HEENT: Normocephalic, atraumatic  Neck: supple, no lymphadenopathy  CV: Regular rate & regular rhythm  Lungs: decreased BS at bases, no fremitus  Abdomen: Soft, BS present  Ext: Warm, well perfused  Neuro: non focal, awake  Skin: no rash, no erythema  Lines: no phlebitis    FH: Non-contributory  Social Hx: Non-contributory    TESTS & MEASUREMENTS:                        10.2   11.63 )-----------( 527      ( 03 Mar 2025 23:10 )             31.5     03-03    138  |  102  |  9[L]  ----------------------------<  112[H]  4.0   |  23  |  0.7    Ca    9.0      03 Mar 2025 23:10  Phos  3.2     03-03  Mg     1.8     03-03          Urinalysis Basic - ( 03 Mar 2025 23:10 )    Color: x / Appearance: x / SG: x / pH: x  Gluc: 112 mg/dL / Ketone: x  / Bili: x / Urobili: x   Blood: x / Protein: x / Nitrite: x   Leuk Esterase: x / RBC: x / WBC x   Sq Epi: x / Non Sq Epi: x / Bacteria: x        Urinalysis with Rflx Culture (collected 02-23-25 @ 09:25)            INFECTIOUS DISEASES TESTING      INFLAMMATORY MARKERS      RADIOLOGY & ADDITIONAL TESTS:  I have personally reviewed the last available Chest xray  CXR      CT      CARDIOLOGY TESTING  12 Lead ECG:   Ventricular Rate 107 BPM    Atrial Rate 107 BPM    P-R Interval 118 ms    QRS Duration 76 ms    Q-T Interval 342 ms    QTC Calculation(Bazett) 456 ms    P Axis 39 degrees    R Axis 0 degrees    T Axis 30 degrees    Diagnosis Line Sinus tachycardia  Minimal voltage criteria for LVH, may be normal variant ( R in aVL )  Possible Anterior infarct , age undetermined  Abnormal ECG    Confirmed by Rayshawn Marcus ([id number]) on 2/27/2025 9:19:30 AM (02-25-25 @ 22:47)  12 Lead ECG:   Ventricular Rate 111 BPM    Atrial Rate 111 BPM    P-R Interval 132 ms    QRS Duration 80 ms    Q-T Interval 330 ms    QTC Calculation(Bazett) 448 ms    P Axis 44 degrees    R Axis 6 degrees    T Axis 43 degrees    Diagnosis Line Sinus tachycardia  Minimal voltage criteria for LVH, may be normal variant ( R in aVL )  Borderline ECG    Confirmed by Vaughn Jack ([id number]) on 2/25/2025 6:43:00 PM (02-25-25 @ 00:53)      MEDICATIONS  acetaminophen     Tablet .. 650 Oral every 6 hours  chlorhexidine 2% Cloths 1 Topical <User Schedule>  enoxaparin Injectable 40 SubCutaneous every 24 hours  metoprolol tartrate 25 Oral every 12 hours  pantoprazole    Tablet 40 Oral before breakfast  polyethylene glycol 3350 17 Oral daily      WEIGHT  Weight (kg): 75.3 (02-25-25 @ 11:04)  Creatinine: 0.7 mg/dL (03-03-25 @ 23:10)      ANTIBIOTICS:      All available historical records have been reviewed

## 2025-03-04 NOTE — DISCHARGE NOTE PROVIDER - NSFOLLOWUPCLINICS_GEN_ALL_ED_FT
[facility]  Medicine  [address], [place]   Phone: [phone]  Fax:   Follow Up Time: 1 month

## 2025-03-04 NOTE — PROGRESS NOTE ADULT - SUBJECTIVE AND OBJECTIVE BOX
GENERAL SURGERY PROGRESS NOTE     JOSE DE JESUS MYLES  [address] [place] [address] day :10d    POD:7d  Procedure: Exploratory laparotomy with Raissa procedure      Surgical Attending: Jj Stein  Overnight events: No acute events overnight. Pt tolerating a regular diet without any nausea or vomiting. Ostomy is pink and well perfused with gas and liquid stool in appliance.     T(F): 98.3 (03-04-25 @ 00:21), Max: 98.8 (03-03-25 @ 17:00)  HR: 106 (03-04-25 @ 00:21) (94 - 111)  BP: 131/77 (03-04-25 @ 00:21) (113/78 - 135/76)  ABP: --  ABP(mean): --  RR: 18 (03-04-25 @ 00:21) (18 - 18)  SpO2: 98% (03-04-25 @ 00:21) (96% - 99%)    IN'S / OUT's:    03-02-25 @ 07:01  -  03-03-25 @ 07:00  --------------------------------------------------------  IN:  Total IN: 0 mL    OUT:    Colostomy (mL): 0 mL    Drain (mL): 110 mL  Total OUT: 110 mL    Total NET: -110 mL      03-03-25 @ 07:01  -  03-04-25 @ 01:59  --------------------------------------------------------  IN:  Total IN: 0 mL    OUT:    Drain (mL): 50 mL  Total OUT: 50 mL    Total NET: -50 mL          PHYSICAL EXAM:  GENERAL: NAD  CHEST/LUNG: equal chest rise b/l, non labored breathing   HEART: Regular rate and rhythm  ABDOMEN: Soft, Nontender, Nondistended; ostomy pink and well perfused with gas and liquid stool in appliance   EXTREMITIES:  No clubbing, cyanosis, or edema      LABS  Labs:  CAPILLARY BLOOD GLUCOSE                              10.2   11.63 )-----------( 527      ( 03 Mar 2025 23:10 )             31.5       Auto Immature Granulocyte %: 1.0 % (03-03-25 @ 23:10)    03-03    138  |  102  |  9[L]  ----------------------------<  112[H]  4.0   |  23  |  0.7      Calcium: 9.0 mg/dL (03-03-25 @ 23:10)      LFTs:         Coags:            Urinalysis Basic - ( 03 Mar 2025 23:10 )    Color: x / Appearance: x / SG: x / pH: x  Gluc: 112 mg/dL / Ketone: x  / Bili: x / Urobili: x   Blood: x / Protein: x / Nitrite: x   Leuk Esterase: x / RBC: x / WBC x   Sq Epi: x / Non Sq Epi: x / Bacteria: x            RADIOLOGY & ADDITIONAL TESTS:      A/P:  JOSE DE JESUS MYLES is a 70y y/o  Female  s/p exploratory laparotomy with raissa's procedure      PLAN:   - monitor ostomy output quality and quantity  - continue bowel regimen  - monitor NORY drain output quality and quantity   - multi modal pain control  - daily labs, replete electrolytes as needed   - DVT and GI ppx  - encourage ambulation and IS as tolerated   - F/U CM/SW for dispo planning       #DVT ppx: enoxaparin Injectable 40 milliGRAM(s) SubCutaneous every 24 hours    #GI ppx: pantoprazole    Tablet 40 milliGRAM(s) Oral before breakfast    Disposition:  4C    Above plan to be discussed with Attending Surgeon Dr. Boswell  , patient, patient family, and rest of health care team    TAP (Trauma, Acute care, Pediatrics) [phone]

## 2025-03-04 NOTE — DISCHARGE NOTE PROVIDER - NSDCFUSCHEDAPPT_GEN_ALL_CORE_FT
David Aviles  [facility]  [facility] [address]  Scheduled Appointment: 03/24/2025

## 2025-03-04 NOTE — PROGRESS NOTE ADULT - ASSESSMENT
70y y/o  Female  s/p exploratory laparotomy with raissa's procedure    PLAN:   - Monitor ostomy output quality and quantity   - Continue Miralax  - Monitor NORY drain quality and quality   - Continue zosyn over the weekned per piotr.   - DVT and GI ppx  - Daily labs, replete electrolytes as needed   - Multi modal pain control   - Ostomy RN recs appreciated   - Encourage ambulation and IS as tolerated   - F/u CM/SW for dispo planning     TRAUMA SURGERY SPECTRA: [id number]
A/P:  JOSE DE JESUS MYLES is a  70y y/o  Female  s/p exploratory laparotomy with raissa's procedure    PLAN:   - Monitor ostomy output quality and quantity   - Continue Miralax  - Monitor NORY drain quality and quality   - Continue zosyn  - DVT and GI ppx  - Daily labs, replete electrolytes as needed   - Multi modal pain control   - Ostomy RN recs appreciated   - Encourage ambulation and IS as tolerated   - F/u CM/SW for dispo planning   
ASSESSMENT  Pt is a 70y female PMHx of breast cancer, diverticulitis. PSHx of hysterectomy. Pt presents with LLQ pain that occurred a few hours after eating last night follow by a few episodes of nbnb diarrhea     IMPRESSION  #Diverticulitis with contained perforation   - CT Abdomen and Pelvis w/ IV Cont (02.23.25 @ 08:53): 4.5 x 2.9 cm focus of gas with a small air-fluid level seen adjacent to  the sigmoid colon suggestive of a contained perforation. Mild adjacent  fat stranding, which may be related to underlying diverticulitis. Consider interval follow-up with p.o. or rectal contrast.  - s/p ex-lap with Caroline's procedure 2/25     #Breast Cancer    #Abx allergy: No Known Drug Allergies    RECOMMENDATIONS  - continue zosyn 3.375 mg q 8 hours   - trend WBC   - at least 5 days of antibiotics post-op assuming adequate source control -- but will trend WBC     Please call or message on Microsoft Teams if with any questions.  Spectra [phone]
ASSESSMENT  Pt is a 70y female PMHx of breast cancer, diverticulitis. PSHx of hysterectomy. Pt presents with LLQ pain that occurred a few hours after eating last night follow by a few episodes of nbnb diarrhea     IMPRESSION  #Diverticulitis with contained perforation   - CT Abdomen and Pelvis w/ IV Cont (02.23.25 @ 08:53): 4.5 x 2.9 cm focus of gas with a small air-fluid level seen adjacent to  the sigmoid colon suggestive of a contained perforation. Mild adjacent  fat stranding, which may be related to underlying diverticulitis. Consider interval follow-up with p.o. or rectal contrast.  - s/p ex-lap with Carolnie's procedure 2/25     #Breast Cancer    #Abx allergy: No Known Drug Allergies    RECOMMENDATIONS  - completed course of antibiotics   - trend WBC for now   - awaiting colostomy output   - recall as needed     Please call or message on Microsoft Teams if with any questions.  Spectra [phone]
Pt is a 70y female PMHx of breast cancer, diverticulitis. PSHx of hysterectomy. Pt presents with LLQ pain that occurred a few hours after eating last night follow by a few episodes of nbnb diarrhea    Management per surgery.  Medicine for Co-management.     #Diverticulitis with contained perforation   CT Abdomen and Pelvis w/ IV Cont (02.23.25 @ 08:53): 4.5 x 2.9 cm focus of gas with a small air-fluid level seen adjacent to  the sigmoid colon suggestive of a contained perforation. Mild adjacent  fat stranding, which may be related to underlying diverticulitis. Consider interval follow-up with p.o. or rectal contrast.  s/p ex-lap with Caroline's procedure 2/25   On Zosyn - per ID last day 03/02  pain control   bowel regimen  Incentive spirometer   rest of management per primary team       #Breast Cancer (in remission)   #Fibroid s/p Hysterectomy   outpt f/u     #Normocytic Anemia   Hb 11 previously 12.1     #Hypomagnesemia   1.7  replete and repeat    DVT PPx Lovenox  
Pt is a 70y female PMHx of breast cancer, diverticulitis. PSHx of hysterectomy. Pt presents with LLQ pain that occurred a few hours after eating last night follow by a few episodes of nbnb diarrhea    #Diverticulitis with contained perforation   CT Abdomen and Pelvis w/ IV Cont (02.23.25 @ 08:53): 4.5 x 2.9 cm focus of gas with a small air-fluid level seen adjacent to  the sigmoid colon suggestive of a contained perforation. Mild adjacent  fat stranding, which may be related to underlying diverticulitis. Consider interval follow-up with p.o. or rectal contrast.  s/p ex-lap with Caroline's procedure 2/25   On Zosyn - per ID last day 03/02  pain control   bowel regimen  Incentive spirometer   rest of management per primary team       #Breast Cancer (in remission)   #Fibroid s/p Hysterectomy   outpt f/u     #Normocytic Anemia   Hb 11 previously 12.1     #Hypomagnesemia   1.7  replete and repeat    DVT PPx Lovenox    
ASSESSMENT  Pt is a 70y female PMHx of breast cancer, diverticulitis. PSHx of hysterectomy. Pt presents with LLQ pain that occurred a few hours after eating last night follow by a few episodes of nbnb diarrhea     IMPRESSION  #Diverticulitis with contained perforation   - CT Abdomen and Pelvis w/ IV Cont (02.23.25 @ 08:53): 4.5 x 2.9 cm focus of gas with a small air-fluid level seen adjacent to  the sigmoid colon suggestive of a contained perforation. Mild adjacent  fat stranding, which may be related to underlying diverticulitis. Consider interval follow-up with p.o. or rectal contrast.  - s/p ex-lap with Caroline's procedure 2/25     #Breast Cancer    #Abx allergy: No Known Drug Allergies    RECOMMENDATIONS  - continue zosyn 3.375 mg q 8 hours   - trend WBC   - at least 5 days of antibiotics post-op which would be on 3/2     Please call or message on Microsoft Teams if with any questions.  Spectra [phone]
ASSESSMENT/PLAN:   70y y/o  Female  s/p exploratory laparotomy with raissa's procedure    PLAN:  - NGT to LCWS  - NPO, IVF  - Zosyn  - Engel until tomorrow AM (2/26)  - AM labs  - Pain control  - Encourage OOB, IS    SPECTRA: [id number]
ASSESSMENT  Pt is a 70y female PMHx of breast cancer, diverticulitis. PSHx of hysterectomy. Pt presents with LLQ pain that occurred a few hours after eating last night follow by a few episodes of nbnb diarrhea     IMPRESSION  #Diverticulitis with contained perforation   - CT Abdomen and Pelvis w/ IV Cont (02.23.25 @ 08:53): 4.5 x 2.9 cm focus of gas with a small air-fluid level seen adjacent to  the sigmoid colon suggestive of a contained perforation. Mild adjacent  fat stranding, which may be related to underlying diverticulitis. Consider interval follow-up with p.o. or rectal contrast.    #Breast Cancer    #Abx allergy: No Known Drug Allergies    RECOMMENDATIONS  - continue zosyn 3.375 mg q 8 hours   - planned for OR today   - will follow-up any Cx sent     Please call or message on Microsoft Teams if with any questions.  Spectra [phone]
ASSESSMENT  Pt is a 70y female PMHx of breast cancer, diverticulitis. PSHx of hysterectomy. Pt presents with LLQ pain that occurred a few hours after eating last night follow by a few episodes of nbnb diarrhea     IMPRESSION  #Diverticulitis with contained perforation   - CT Abdomen and Pelvis w/ IV Cont (02.23.25 @ 08:53): 4.5 x 2.9 cm focus of gas with a small air-fluid level seen adjacent to  the sigmoid colon suggestive of a contained perforation. Mild adjacent  fat stranding, which may be related to underlying diverticulitis. Consider interval follow-up with p.o. or rectal contrast.  - s/p ex-lap with Caroline's procedure 2/25     #Breast Cancer    #Abx allergy: No Known Drug Allergies    RECOMMENDATIONS  - completed course of antibiotics   - trend WBC  - recall as needed     Please call or message on Microsoft Teams if with any questions.  Spectra [phone]

## 2025-03-04 NOTE — DISCHARGE NOTE PROVIDER - NSDCMRMEDTOKEN_GEN_ALL_CORE_FT
amoxicillin-clavulanate 875 mg-125 mg oral tablet: 1 tab(s) orally 2 times a day  multivitamin: 1 tab(s) orally once a day

## 2025-03-04 NOTE — DISCHARGE NOTE PROVIDER - NSDCCPCAREPLAN_GEN_ALL_CORE_FT
PRINCIPAL DISCHARGE DIAGNOSIS  Diagnosis: Perforated diverticulum  Assessment and Plan of Treatment: Activity: No heavy lifting > 10 lbs for 2 weeks. Avoid straining or excessive activity x 6 weeks.   Dressings: Do not scrub wounds. You may shower but do not bathe. May use ice packs for pain and swelling. Please continue daily packing changes with your visiting nurse. 1/2" plain packing, gauze, and paper tape  Ostomy: Please maintain ostomy care and record output.   Pain control: You may take over-the-counter tylenol and motrin three times per day with food for up to 3 days.  Medications: You may take your home medications. Antibiotics were sent to your pharmacy, please take as prescribed.   Follow up: Please call the number provided to make an appointment with Dr. Aviles in 1-2 weeks. Please call with any questions or concerns including fevers, worsening pain, pus from the wounds, or redness of the skin.

## 2025-03-04 NOTE — DISCHARGE NOTE PROVIDER - CARE PROVIDERS DIRECT ADDRESSES
[email or web address]

## 2025-03-04 NOTE — PROGRESS NOTE ADULT - REASON FOR ADMISSION
contained perforated sigmoid diverticulitis

## 2025-03-04 NOTE — DISCHARGE NOTE PROVIDER - HOSPITAL COURSE
70y female PMHx of breast cancer, diverticulitis. PSHx of hysterectomy. Pt presents with LLQ pain that occurred a few hours after eating last night follow by a few episodes of nbnb diarrhea. Denies nausea, vomiting, fever, chills. Pt endorses pain is similar to her previous episodes of diverticulitis. Pt was admitted to surgery service in 08/2024 for management of sigmoid diverticulitis in which she did well on a course of IV antibiotics, and diet. After discharge, pt underwent colonoscopy which was normal. CT A/P show 4.5 x 2.9 cm focus of gas with a small air-fluid level seen adjacent to the sigmoid colon suggestive of a contained perforation. Mild adjacent fat stranding, which may be related to underlying diverticulitis. Labs significant for WBC 13.72. Vitals , /89, Temperature 99.9. Patient admitted to surgery for IV Zosyn, serial abdominal exams, bowel rest and IVF. IR consulted for left pelvic abscess is not amenable for drainage. ID recommended IV zosyn. HD2 patient was brought to the OR for exploratory laparotomy, findings of acute inflamed sigmoid colon, sigmoidectomy, creation of end colostomy, placement of 10Fr NORY drain near rectal stump. Post operatively, NGT in place, pain was controlled with PCA pump that was eventually weaned off. Wound vac placed, functioning, eventually removed. NGT removed, diet was slowly advanced and tolerated appropriately. NORY drain serous fluid, removed prior to discharged. Ostomy has gas and output. Patient is medically appropriate for discharge with PO abx as per Dr. Aviles.

## 2025-03-04 NOTE — DISCHARGE NOTE PROVIDER - EXTENDED VTE YES NO FOR MLM ENOXAPARIN
losartan (COZAAR) 100 MG tablet  Last refill: 05/09/2022  Last OV: 03/30/2022  Upcoming OV: 09/30/2022   ,

## 2025-03-04 NOTE — DISCHARGE NOTE NURSING/CASE MANAGEMENT/SOCIAL WORK - FINANCIAL ASSISTANCE
[facility] provides services at a reduced cost to those who are determined to be eligible through [facility]’s financial assistance program. Information regarding [facility]’s financial assistance program can be found by going to [email or web address] or by calling [phone].

## 2025-03-04 NOTE — DISCHARGE NOTE NURSING/CASE MANAGEMENT/SOCIAL WORK - PATIENT PORTAL LINK FT
You can access the FollowMyHealth Patient Portal offered by [facility] by registering at the following website: [email or web address]. By joining [employer]’s FollowMyHealth portal, you will also be able to view your health information using other applications (apps) compatible with our system.

## 2025-03-04 NOTE — DISCHARGE NOTE PROVIDER - CARE PROVIDER_API CALL
David Aviles  Surgical Critical Care  [address] [place] [address], [address] [place] [address]  Phone: [phone]  Fax: [phone]  Follow Up Time: 2 weeks

## 2025-03-11 DIAGNOSIS — K65.8 OTHER PERITONITIS: ICD-10-CM

## 2025-03-11 DIAGNOSIS — K65.1 PERITONEAL ABSCESS: ICD-10-CM

## 2025-03-11 DIAGNOSIS — D64.9 ANEMIA, UNSPECIFIED: ICD-10-CM

## 2025-03-11 DIAGNOSIS — E83.42 HYPOMAGNESEMIA: ICD-10-CM

## 2025-03-11 DIAGNOSIS — Z71.3 DIETARY COUNSELING AND SURVEILLANCE: ICD-10-CM

## 2025-03-11 DIAGNOSIS — Z85.3 PERSONAL HISTORY OF MALIGNANT NEOPLASM OF BREAST: ICD-10-CM

## 2025-03-11 DIAGNOSIS — K57.20 DIVERTICULITIS OF LARGE INTESTINE WITH PERFORATION AND ABSCESS WITHOUT BLEEDING: ICD-10-CM

## 2025-03-11 DIAGNOSIS — Z90.710 ACQUIRED ABSENCE OF BOTH CERVIX AND UTERUS: ICD-10-CM

## 2025-03-18 ENCOUNTER — APPOINTMENT (OUTPATIENT)
Dept: SURGERY | Facility: CLINIC | Age: 70
End: 2025-03-18
Payer: MEDICARE

## 2025-03-18 VITALS
HEART RATE: 97 BPM | DIASTOLIC BLOOD PRESSURE: 86 MMHG | WEIGHT: 195 LBS | OXYGEN SATURATION: 97 % | BODY MASS INDEX: 34.55 KG/M2 | HEIGHT: 63 IN | TEMPERATURE: 97 F | SYSTOLIC BLOOD PRESSURE: 128 MMHG

## 2025-03-18 PROCEDURE — 99213 OFFICE O/P EST LOW 20 MIN: CPT | Mod: 24

## 2025-03-27 ENCOUNTER — EMERGENCY (EMERGENCY)
Facility: HOSPITAL | Age: 70
LOS: 0 days | Discharge: HOME | End: 2025-03-28
Attending: EMERGENCY MEDICINE
Payer: MEDICARE

## 2025-03-27 VITALS
RESPIRATION RATE: 18 BRPM | DIASTOLIC BLOOD PRESSURE: 65 MMHG | HEART RATE: 117 BPM | TEMPERATURE: 98 F | SYSTOLIC BLOOD PRESSURE: 132 MMHG | HEIGHT: 63 IN

## 2025-03-27 DIAGNOSIS — Z90.710 ACQUIRED ABSENCE OF BOTH CERVIX AND UTERUS: Chronic | ICD-10-CM

## 2025-03-27 DIAGNOSIS — Z98.890 OTHER SPECIFIED POSTPROCEDURAL STATES: Chronic | ICD-10-CM

## 2025-03-27 PROCEDURE — 36415 COLL VENOUS BLD VENIPUNCTURE: CPT

## 2025-03-27 PROCEDURE — 99284 EMERGENCY DEPT VISIT MOD MDM: CPT | Mod: FS

## 2025-03-27 PROCEDURE — 99284 EMERGENCY DEPT VISIT MOD MDM: CPT | Mod: 25

## 2025-03-27 PROCEDURE — 73552 X-RAY EXAM OF FEMUR 2/>: CPT | Mod: RT

## 2025-03-27 PROCEDURE — 73502 X-RAY EXAM HIP UNI 2-3 VIEWS: CPT | Mod: RT

## 2025-03-27 PROCEDURE — 85025 COMPLETE CBC W/AUTO DIFF WBC: CPT

## 2025-03-27 PROCEDURE — 96374 THER/PROPH/DIAG INJ IV PUSH: CPT

## 2025-03-27 PROCEDURE — 80053 COMPREHEN METABOLIC PANEL: CPT

## 2025-03-27 RX ORDER — KETOROLAC TROMETHAMINE 30 MG/ML
30 INJECTION, SOLUTION INTRAMUSCULAR; INTRAVENOUS ONCE
Refills: 0 | Status: DISCONTINUED | OUTPATIENT
Start: 2025-03-27 | End: 2025-03-27

## 2025-03-27 NOTE — ED ADULT TRIAGE NOTE - SOURCE OF INFORMATION
Start sitz baths tomorrow morning, remove gauze packing with first bath tomorrow - repack with plain gauze after bath. Do sitz baths at least once a day - change gauze packing at least twice a day    Post Anesthesia Care  • No driving for 24 hours after anesthesia   • No driving when taking prescription pain medications   • No alcohol or smoking for 24 hours   • Ambulate with assistance  • Someone should stay with you for first 24 hours after anesthesia  • Drink plenty of fluids  • Rest today, ease into normal activity or refer to surgeons activity restrictions starting tomorrow  • Resume normal diet, avoid greasy and spicy foods        Patient

## 2025-03-27 NOTE — ED PROVIDER NOTE - OBJECTIVE STATEMENT
70 yold female to ED Pmhx diverticulitis with recent admission with perforation and partial colon resection(d/c 2/25) Breast ca s/p resection; pt presents to Ed c/o right side lower back pain radiating to right groin/upper femoral area x 2 days possibly related to leg exercises; pt denies and bowel or abdominal issues - eating and drinking without issues; denies n/v, abdominal pain, fever, chills, dysuria, frequency, urgency;

## 2025-03-27 NOTE — ED PROVIDER NOTE - NSFOLLOWUPINSTRUCTIONS_ED_ALL_ED_FT
Back Pain    WHAT YOU NEED TO KNOW:    Back pain is common. It can be caused by many conditions, such as arthritis or the breakdown of spinal discs. Your risk for back pain is increased by injuries, lack of activity, or repeated bending and twisting. You may feel sore or stiff on one or both sides of your back. The pain may spread to your buttocks or thighs.    DISCHARGE INSTRUCTIONS:    Return to the emergency department if:     You have pain, numbness, or weakness in one or both legs.      Your pain becomes so severe that you cannot walk.      You cannot control your urine or bowel movements.      You have severe back pain with chest pain.      You have severe back pain, nausea, and vomiting.      You have severe back pain that spreads to your side or genital area.    Contact your healthcare provider if:     You have back pain that does not get better with rest and pain medicine.      You have a fever.      You have pain that worsens when you are on your back or when you rest.      You have pain that worsens when you cough or sneeze.      You lose weight without trying.      You have questions or concerns about your condition or care.    Medicines:     NSAIDs help decrease swelling and pain. This medicine is available with or without a doctor's order. NSAIDs can cause stomach bleeding or kidney problems in certain people. If you take blood thinner medicine, always ask your healthcare provider if NSAIDs are safe for you. Always read the medicine label and follow directions.      Acetaminophen decreases pain and fever. It is available without a doctor's order. Ask how much to take and how often to take it. Follow directions. Read the labels of all other medicines you are using to see if they also contain acetaminophen, or ask your doctor or pharmacist. Acetaminophen can cause liver damage if not taken correctly. Do not use more than 4 grams (4,000 milligrams) total of acetaminophen in one day.       Muscle relaxers help decrease muscle spasms and back pain.      Prescription pain medicine may be given. Ask your healthcare provider how to take this medicine safely. Some prescription pain medicines contain acetaminophen. Do not take other medicines that contain acetaminophen without talking to your healthcare provider. Too much acetaminophen may cause liver damage. Prescription pain medicine may cause constipation. Ask your healthcare provider how to prevent or treat constipation.       Take your medicine as directed. Contact your healthcare provider if you think your medicine is not helping or if you have side effects. Tell him or her if you are allergic to any medicine. Keep a list of the medicines, vitamins, and herbs you take. Include the amounts, and when and why you take them. Bring the list or the pill bottles to follow-up visits. Carry your medicine list with you in case of an emergency.    How to manage your back pain:     Apply ice on your back for 15 to 20 minutes every hour or as directed. Use an ice pack, or put crushed ice in a plastic bag. Cover it with a towel before you apply it to your skin. Ice helps prevent tissue damage and decreases pain.      Apply heat on your back for 20 to 30 minutes every 2 hours for as many days as directed. Heat helps decrease pain and muscle spasms.      Stay active as much as you can without causing more pain. Bed rest could make your back pain worse. Avoid heavy lifting until your pain is gone.      Go to physical therapy as directed. A physical therapist can teach you exercises to help improve movement and strength, and to decrease pain.    Follow up with your healthcare provider in 2 weeks, or as directed: Write down your questions so you remember to ask them during your visits.       © Copyright [employer] 2019 All illustrations and images included in CareNotes are the copyrighted property of [employer] Inc[employer] or [employer].

## 2025-03-27 NOTE — ED PROVIDER NOTE - ATTENDING APP SHARED VISIT CONTRIBUTION OF CARE
70-year-old female with PMH diverticulitis with perforation status post surgery with colostomy placement presents for evaluation of right groin pain.  Patient feels pain lower with radiation into upper thigh.  No numbness, weakness.  Worse with range of motion.  Denies any trauma or falls.  No fevers, chills, cough, chest pain, shortness of breath or palpitations.  Patient tolerating p.o. as usual.  Reports feeling otherwise well.  Denies any saddle anesthesia or incontinence.    VITAL SIGNS: noted  CONSTITUTIONAL: Well-developed; well-nourished; in no acute distress  HEAD: Normocephalic; atraumatic  EYES: PERRL, EOM intact; conjunctiva and sclera clear  ENT: No nasal discharge; airway clear. MMM  NECK: Supple; non tender. No anterior cervical lymphadenopathy noted  CARD: S1, S2 normal; no murmurs, gallops, or rubs. Regular rate and rhythm  RESP: CTAB/L, no wheezes, rales or rhonchi  ABD: Normal bowel sounds; soft; non-distended; non-tender; + colostomy in place, no CVA tenderness  EXT: Normal ROM. No calf tenderness or edema. Distal pulses intact  NEURO: Alert, oriented. Grossly unremarkable. No focal deficits  SKIN: Skin exam is warm and dry, no acute rash  MS: No midline spinal tenderness , Mild tenderness paravertebral SI joints area, no skin changes, no increased warmth, no rashes, reproduced with range of motion

## 2025-03-27 NOTE — ED PROVIDER NOTE - PATIENT PORTAL LINK FT
You can access the FollowMyHealth Patient Portal offered by [facility] by registering at the following website: [email or web address]. By joining [employer]’s FollowMyHealth portal, you will also be able to view your health information using other applications (apps) compatible with our system.

## 2025-03-27 NOTE — ED PROVIDER NOTE - CLINICAL SUMMARY MEDICAL DECISION MAKING FREE TEXT BOX
Patient evaluated for low back discomfort, labs and imaging reviewed, no acute findings.  Patient symptoms improved with meds.  Reported feeling well to go home.  Advise close follow-up with PMD and orthopedics and patient agreed.  Strict return precautions advised and patient verbalized understanding.

## 2025-03-27 NOTE — ED PROVIDER NOTE - PHYSICAL EXAMINATION
Constitutional: Well developed, well nourished. NAD  Head: Normocephalic, atraumatic.  Eyes: PERRL, EOMI.  ENT: No nasal discharge. Mucous membranes dry.  Neck: Supple. Painless ROM.  Cardiovascular:  . No murmurs, rubs, or gallops.  Pulmonary:  Lungs clear to auscultation bilaterally.    Abdominal: Soft. Nondistended. No rebound, guarding, rigidity. colostomy noted LLQ; no abdominal tenderness;   Extremities. Pelvis stable. right side lower back tenderness at SI joint area without redenss, swelling warmth  or signs of infection or zoster; + tenderness to sciatic area;   Skin: No rashes, cyanosis.  Neuro: AAOx3. No focal neurological deficits.  Psych: Normal mood. Normal affect.

## 2025-03-27 NOTE — ED PROVIDER NOTE - CARE PROVIDER_API CALL
Kaelyn Love   Internal Medicine  [address] [place] [address]  Phone: [phone]  Fax: [phone]  Established Patient  Follow Up Time: 7-10 Days

## 2025-03-27 NOTE — ED ADULT TRIAGE NOTE - CHIEF COMPLAINT QUOTE
Pt presents to ED c/o pain to left groin region causing difficulty with ambulation Pt presents to ED c/o pain to right groin causing difficulty with ambulation

## 2025-03-28 VITALS
OXYGEN SATURATION: 95 % | TEMPERATURE: 98 F | HEART RATE: 95 BPM | SYSTOLIC BLOOD PRESSURE: 117 MMHG | DIASTOLIC BLOOD PRESSURE: 72 MMHG | RESPIRATION RATE: 18 BRPM

## 2025-03-28 LAB
ALBUMIN SERPL ELPH-MCNC: 3.9 G/DL — SIGNIFICANT CHANGE UP (ref 3.5–5.2)
ALP SERPL-CCNC: 93 U/L — SIGNIFICANT CHANGE UP (ref 30–115)
ALT FLD-CCNC: 6 U/L — SIGNIFICANT CHANGE UP (ref 0–41)
ANION GAP SERPL CALC-SCNC: 17 MMOL/L — HIGH (ref 7–14)
AST SERPL-CCNC: 21 U/L — SIGNIFICANT CHANGE UP (ref 0–41)
BASOPHILS # BLD AUTO: 0.02 K/UL — SIGNIFICANT CHANGE UP (ref 0–0.2)
BASOPHILS NFR BLD AUTO: 0.2 % — SIGNIFICANT CHANGE UP (ref 0–1)
BILIRUB SERPL-MCNC: 0.2 MG/DL — SIGNIFICANT CHANGE UP (ref 0.2–1.2)
BUN SERPL-MCNC: 12 MG/DL — SIGNIFICANT CHANGE UP (ref 10–20)
CALCIUM SERPL-MCNC: 9.3 MG/DL — SIGNIFICANT CHANGE UP (ref 8.4–10.5)
CHLORIDE SERPL-SCNC: 99 MMOL/L — SIGNIFICANT CHANGE UP (ref 98–110)
CO2 SERPL-SCNC: 23 MMOL/L — SIGNIFICANT CHANGE UP (ref 17–32)
CREAT SERPL-MCNC: 0.8 MG/DL — SIGNIFICANT CHANGE UP (ref 0.7–1.5)
EGFR: 78 ML/MIN/1.73M2 — SIGNIFICANT CHANGE UP
EGFR: 78 ML/MIN/1.73M2 — SIGNIFICANT CHANGE UP
EOSINOPHIL # BLD AUTO: 0.16 K/UL — SIGNIFICANT CHANGE UP (ref 0–0.7)
EOSINOPHIL NFR BLD AUTO: 1.5 % — SIGNIFICANT CHANGE UP (ref 0–8)
GLUCOSE SERPL-MCNC: 104 MG/DL — HIGH (ref 70–99)
HCT VFR BLD CALC: 30.7 % — LOW (ref 37–47)
HGB BLD-MCNC: 9.9 G/DL — LOW (ref 12–16)
IMM GRANULOCYTES NFR BLD AUTO: 0.4 % — HIGH (ref 0.1–0.3)
LYMPHOCYTES # BLD AUTO: 1.22 K/UL — SIGNIFICANT CHANGE UP (ref 1.2–3.4)
LYMPHOCYTES # BLD AUTO: 11.2 % — LOW (ref 20.5–51.1)
MCHC RBC-ENTMCNC: 29.6 PG — SIGNIFICANT CHANGE UP (ref 27–31)
MCHC RBC-ENTMCNC: 32.2 G/DL — SIGNIFICANT CHANGE UP (ref 32–37)
MCV RBC AUTO: 91.6 FL — SIGNIFICANT CHANGE UP (ref 81–99)
MONOCYTES # BLD AUTO: 1.42 K/UL — HIGH (ref 0.1–0.6)
MONOCYTES NFR BLD AUTO: 13.1 % — HIGH (ref 1.7–9.3)
NEUTROPHILS # BLD AUTO: 8.01 K/UL — HIGH (ref 1.4–6.5)
NEUTROPHILS NFR BLD AUTO: 73.6 % — SIGNIFICANT CHANGE UP (ref 42.2–75.2)
NRBC BLD AUTO-RTO: 0 /100 WBCS — SIGNIFICANT CHANGE UP (ref 0–0)
PLATELET # BLD AUTO: 309 K/UL — SIGNIFICANT CHANGE UP (ref 130–400)
PMV BLD: 11.7 FL — HIGH (ref 7.4–10.4)
POTASSIUM SERPL-MCNC: 5.3 MMOL/L — HIGH (ref 3.5–5)
POTASSIUM SERPL-SCNC: 5.3 MMOL/L — HIGH (ref 3.5–5)
PROT SERPL-MCNC: 7.5 G/DL — SIGNIFICANT CHANGE UP (ref 6–8)
RBC # BLD: 3.35 M/UL — LOW (ref 4.2–5.4)
RBC # FLD: 13.9 % — SIGNIFICANT CHANGE UP (ref 11.5–14.5)
SODIUM SERPL-SCNC: 139 MMOL/L — SIGNIFICANT CHANGE UP (ref 135–146)
WBC # BLD: 10.87 K/UL — HIGH (ref 4.8–10.8)
WBC # FLD AUTO: 10.87 K/UL — HIGH (ref 4.8–10.8)

## 2025-03-28 PROCEDURE — 73502 X-RAY EXAM HIP UNI 2-3 VIEWS: CPT | Mod: 26,RT

## 2025-03-28 PROCEDURE — 73552 X-RAY EXAM OF FEMUR 2/>: CPT | Mod: 26,RT

## 2025-03-28 RX ORDER — LIDOCAINE HYDROCHLORIDE 20 MG/ML
1 JELLY TOPICAL
Qty: 1 | Refills: 0
Start: 2025-03-28 | End: 2025-04-01

## 2025-03-28 RX ADMIN — KETOROLAC TROMETHAMINE 30 MILLIGRAM(S): 30 INJECTION, SOLUTION INTRAMUSCULAR; INTRAVENOUS at 00:49

## 2025-03-28 NOTE — ED ADULT NURSE NOTE - OBJECTIVE STATEMENT
69 yo f pt c/o right groin and hip pain. pt states she had partial colon resection february, denies trauma to the area, but states she did work out recently

## 2025-03-28 NOTE — ED ADULT NURSE NOTE - CCCP TRG CHIEF CMPLNT
Spoke to patient-   Per MELIDA Amin NP: \"Dear Ms. Draper,  Good News!  Your echo (4/23/24) is stable. Your heart function is normal.  Please let me know if you have questions.  Best Regards,  CRISTAL Dudley - NP\"   Patient advised of recent results and understanding voiced.   groin pain

## 2025-04-18 NOTE — ED ADULT TRIAGE NOTE - WEIGHT IN KG
Hypernatremia is likely due to Dehydration from missed free water flushes. The patient's most recent sodium results are listed below.    Improving w FWF  Aim to correct the sodium by 8-10mEq in 24 hours.   Resume tube feeds and free water flushes as able  Ensure patient is getting FWF     74.8

## 2025-04-28 ENCOUNTER — APPOINTMENT (OUTPATIENT)
Dept: SURGERY | Facility: CLINIC | Age: 70
End: 2025-04-28
Payer: MEDICARE

## 2025-04-28 VITALS
DIASTOLIC BLOOD PRESSURE: 78 MMHG | WEIGHT: 152.4 LBS | BODY MASS INDEX: 27 KG/M2 | TEMPERATURE: 97 F | SYSTOLIC BLOOD PRESSURE: 126 MMHG | HEIGHT: 63 IN | HEART RATE: 102 BPM | OXYGEN SATURATION: 98 %

## 2025-04-28 PROCEDURE — 99213 OFFICE O/P EST LOW 20 MIN: CPT | Mod: 24

## 2025-06-25 ENCOUNTER — APPOINTMENT (OUTPATIENT)
Age: 70
End: 2025-06-25
Payer: MEDICARE

## 2025-06-25 ENCOUNTER — LABORATORY RESULT (OUTPATIENT)
Age: 70
End: 2025-06-25

## 2025-06-25 VITALS
TEMPERATURE: 97.1 F | HEIGHT: 63 IN | RESPIRATION RATE: 16 BRPM | BODY MASS INDEX: 26.4 KG/M2 | OXYGEN SATURATION: 99 % | WEIGHT: 149 LBS | DIASTOLIC BLOOD PRESSURE: 76 MMHG | SYSTOLIC BLOOD PRESSURE: 131 MMHG | HEART RATE: 94 BPM

## 2025-06-25 PROBLEM — Z80.9 FAMILY HISTORY OF MALIGNANT NEOPLASM: Status: ACTIVE | Noted: 2025-06-25

## 2025-06-25 PROBLEM — R77.8 ELEVATED TOTAL PROTEIN: Status: ACTIVE | Noted: 2025-06-25

## 2025-06-25 LAB
AUTO BASOPHILS #: 0.02 K/UL
AUTO BASOPHILS %: 0.4 %
AUTO EOSINOPHILS #: 0.09 K/UL
AUTO EOSINOPHILS %: 1.9 %
AUTO IMMATURE GRANULOCYTES #: 0.02 K/UL
AUTO LYMPHOCYTES #: 1.64 K/UL
AUTO LYMPHOCYTES %: 34.7 %
AUTO MONOCYTES #: 0.66 K/UL
AUTO MONOCYTES %: 14 %
AUTO NEUTROPHILS #: 2.3 K/UL
AUTO NEUTROPHILS %: 48.6 %
AUTO NRBC #: 0 K/UL
HCT VFR BLD CALC: 36.5 %
HGB BLD-MCNC: 11.9 G/DL
IMM GRANULOCYTES NFR BLD AUTO: 0.4 %
IMMATURE RETICULOCYTE FRACTION %: 6.5 %
MAN DIFF?: NORMAL
MCHC RBC-ENTMCNC: 29 PG
MCHC RBC-ENTMCNC: 32.6 G/DL
MCV RBC AUTO: 88.8 FL
PLATELET # BLD AUTO: 209 K/UL
PMV BLD AUTO: 0 /100 WBCS
PMV BLD: 10.1 FL
RBC # BLD: 4.1 M/UL
RBC # BLD: 4.11 M/UL
RBC # FLD: 13.4 %
RETICS # AUTO: 1.3 %
RETICS AGGREG/RBC NFR: 51.7 K/UL
RETICULOCYTE HEMOGLOBIN EQUIVALENT: 31.3 PG
WBC # FLD AUTO: 4.73 K/UL

## 2025-06-25 PROCEDURE — 99204 OFFICE O/P NEW MOD 45 MIN: CPT

## 2025-06-27 LAB
CRP SERPL-MCNC: 9.8 MG/L
FERRITIN SERPL-MCNC: 514 NG/ML
IRON SATN MFR SERPL: 26 %
IRON SERPL-MCNC: 69 UG/DL
TIBC SERPL-MCNC: 261 UG/DL
UIBC SERPL-MCNC: 192 UG/DL
VIT B12 SERPL-MCNC: 668 PG/ML

## 2025-06-30 LAB
ALBUMIN MFR SERPL ELPH: 50.6 %
ALBUMIN SERPL-MCNC: 4.3 G/DL
ALBUMIN/GLOB SERPL: 1 RATIO
ALPHA1 GLOB MFR SERPL ELPH: 4 %
ALPHA1 GLOB SERPL ELPH-MCNC: 0.3 G/DL
ALPHA2 GLOB MFR SERPL ELPH: 11.7 %
ALPHA2 GLOB SERPL ELPH-MCNC: 1 G/DL
B-GLOBULIN MFR SERPL ELPH: 13.8 %
B-GLOBULIN SERPL ELPH-MCNC: 1.2 G/DL
DEPRECATED KAPPA LC FREE/LAMBDA SER: 1.28 RATIO
GAMMA GLOB FLD ELPH-MCNC: 1.7 G/DL
GAMMA GLOB MFR SERPL ELPH: 19.9 %
IGA SERPL-MCNC: 575 MG/DL
IGG SERPL-MCNC: 1661 MG/DL
IGM SERPL-MCNC: 49 MG/DL
INTERPRETATION SERPL IEP-IMP: NORMAL
KAPPA LC CSF-MCNC: 3.46 MG/DL
KAPPA LC SERPL-MCNC: 4.42 MG/DL
M PROTEIN SPEC IFE-MCNC: NORMAL
PROT SERPL-MCNC: 8.5 G/DL
PROT SERPL-MCNC: 8.5 G/DL

## 2025-07-05 ENCOUNTER — NON-APPOINTMENT (OUTPATIENT)
Age: 70
End: 2025-07-05

## 2025-07-07 ENCOUNTER — TRANSCRIPTION ENCOUNTER (OUTPATIENT)
Age: 70
End: 2025-07-07

## 2025-07-07 ENCOUNTER — APPOINTMENT (OUTPATIENT)
Dept: SURGERY | Facility: CLINIC | Age: 70
End: 2025-07-07
Payer: MEDICARE

## 2025-07-07 VITALS
HEIGHT: 63 IN | BODY MASS INDEX: 26.58 KG/M2 | OXYGEN SATURATION: 97 % | TEMPERATURE: 97 F | HEART RATE: 106 BPM | DIASTOLIC BLOOD PRESSURE: 82 MMHG | WEIGHT: 150 LBS | SYSTOLIC BLOOD PRESSURE: 136 MMHG

## 2025-07-07 PROCEDURE — 99213 OFFICE O/P EST LOW 20 MIN: CPT

## 2025-07-29 ENCOUNTER — APPOINTMENT (OUTPATIENT)
Age: 70
End: 2025-07-29
Payer: MEDICARE

## 2025-07-29 VITALS
HEIGHT: 63 IN | BODY MASS INDEX: 26.75 KG/M2 | SYSTOLIC BLOOD PRESSURE: 133 MMHG | OXYGEN SATURATION: 100 % | TEMPERATURE: 98.2 F | DIASTOLIC BLOOD PRESSURE: 79 MMHG | HEART RATE: 102 BPM | WEIGHT: 151 LBS | RESPIRATION RATE: 16 BRPM

## 2025-07-29 DIAGNOSIS — D64.9 ANEMIA, UNSPECIFIED: ICD-10-CM

## 2025-07-29 PROCEDURE — 99213 OFFICE O/P EST LOW 20 MIN: CPT

## 2025-07-30 ENCOUNTER — APPOINTMENT (OUTPATIENT)
Dept: SURGERY | Facility: CLINIC | Age: 70
End: 2025-07-30
Payer: MEDICARE

## 2025-07-30 VITALS
OXYGEN SATURATION: 99 % | WEIGHT: 151 LBS | HEART RATE: 98 BPM | SYSTOLIC BLOOD PRESSURE: 116 MMHG | BODY MASS INDEX: 26.75 KG/M2 | TEMPERATURE: 97 F | DIASTOLIC BLOOD PRESSURE: 76 MMHG | HEIGHT: 63 IN

## 2025-07-30 DIAGNOSIS — K57.20 DIVERTICULITIS OF LARGE INTESTINE WITH PERFORATION AND ABSCESS W/OUT BLEEDING: ICD-10-CM

## 2025-07-30 PROCEDURE — 99204 OFFICE O/P NEW MOD 45 MIN: CPT

## 2025-08-30 ENCOUNTER — OUTPATIENT (OUTPATIENT)
Dept: OUTPATIENT SERVICES | Facility: HOSPITAL | Age: 70
LOS: 1 days | End: 2025-08-30
Payer: MEDICARE

## 2025-08-30 ENCOUNTER — RESULT REVIEW (OUTPATIENT)
Age: 70
End: 2025-08-30

## 2025-08-30 DIAGNOSIS — K57.20 DIVERTICULITIS OF LARGE INTESTINE WITH PERFORATION AND ABSCESS WITHOUT BLEEDING: ICD-10-CM

## 2025-08-30 DIAGNOSIS — Z98.890 OTHER SPECIFIED POSTPROCEDURAL STATES: Chronic | ICD-10-CM

## 2025-08-30 DIAGNOSIS — Z90.710 ACQUIRED ABSENCE OF BOTH CERVIX AND UTERUS: Chronic | ICD-10-CM

## 2025-08-30 PROCEDURE — 74177 CT ABD & PELVIS W/CONTRAST: CPT | Mod: 26

## 2025-08-30 PROCEDURE — 74177 CT ABD & PELVIS W/CONTRAST: CPT

## 2025-08-31 DIAGNOSIS — K57.20 DIVERTICULITIS OF LARGE INTESTINE WITH PERFORATION AND ABSCESS WITHOUT BLEEDING: ICD-10-CM

## 2025-09-17 ENCOUNTER — APPOINTMENT (OUTPATIENT)
Dept: SURGERY | Facility: CLINIC | Age: 70
End: 2025-09-17

## 2025-09-17 VITALS
OXYGEN SATURATION: 95 % | HEIGHT: 63 IN | BODY MASS INDEX: 26.75 KG/M2 | HEART RATE: 86 BPM | WEIGHT: 151 LBS | TEMPERATURE: 97 F

## 2025-09-17 DIAGNOSIS — K57.20 DIVERTICULITIS OF LARGE INTESTINE WITH PERFORATION AND ABSCESS W/OUT BLEEDING: ICD-10-CM
